# Patient Record
Sex: FEMALE | Race: WHITE | URBAN - NONMETROPOLITAN AREA
[De-identification: names, ages, dates, MRNs, and addresses within clinical notes are randomized per-mention and may not be internally consistent; named-entity substitution may affect disease eponyms.]

---

## 2017-11-07 ENCOUNTER — DOCTOR'S OFFICE (OUTPATIENT)
Dept: URBAN - NONMETROPOLITAN AREA CLINIC 1 | Facility: CLINIC | Age: 57
Setting detail: OPHTHALMOLOGY
End: 2017-11-07
Payer: COMMERCIAL

## 2017-11-07 DIAGNOSIS — H52.4: ICD-10-CM

## 2017-11-07 PROBLEM — H02.831 DERMATOCHALASIS: Status: ACTIVE | Noted: 2017-11-07

## 2017-11-07 PROBLEM — H04.123 DRY EYE; BOTH EYES: Status: ACTIVE | Noted: 2017-11-07

## 2017-11-07 PROBLEM — H27.8 PSEUDOPHAKIA OU: Status: ACTIVE | Noted: 2017-11-07

## 2017-11-07 PROBLEM — H01.001 BLEPHARITIS: Status: ACTIVE | Noted: 2017-11-07

## 2017-11-07 PROBLEM — H40.053: Status: ACTIVE | Noted: 2017-11-07

## 2017-11-07 PROBLEM — H43.813 POSTERIOR VITREOUS DETACHMENT: Status: ACTIVE | Noted: 2017-11-07

## 2017-11-07 PROCEDURE — 92015 DETERMINE REFRACTIVE STATE: CPT | Performed by: OPTOMETRIST

## 2017-11-07 ASSESSMENT — REFRACTION_CURRENTRX
OD_ADD: +2.75
OS_CYLINDER: -0.25
OS_OVR_VA: 20/
OD_VPRISM_DIRECTION: PROGS
OD_AXIS: 30
OD_AXIS: 010
OS_ADD: +2.75
OD_VPRISM_DIRECTION: PROGS
OS_AXIS: 23
OS_OVR_VA: 20/
OD_CYLINDER: -0.75
OD_OVR_VA: 20/
OS_CYLINDER: -1.25
OS_AXIS: 026
OS_SPHERE: +0.25
OD_SPHERE: PLANO
OS_VPRISM_DIRECTION: PROGS
OD_SPHERE: PLANO
OS_OVR_VA: 20/
OS_VPRISM_DIRECTION: PROGS
OS_ADD: +2.75
OD_CYLINDER: -0.50
OS_SPHERE: +0.50
OD_OVR_VA: 20/
OD_ADD: +2.75
OD_OVR_VA: 20/

## 2017-11-07 ASSESSMENT — VISUAL ACUITY
OD_BCVA: 20/20
OS_BCVA: 20/20

## 2017-11-07 ASSESSMENT — REFRACTION_MANIFEST
OS_VA2: 20/
OD_VA2: 20/
OS_VA1: 20/
OS_VA1: 20/
OU_VA: 20/
OD_VA3: 20/
OD_VA3: 20/
OD_VA1: 20/
OS_VA2: 20/
OS_VA3: 20/
OU_VA: 20/
OS_VA3: 20/
OD_VA1: 20/
OD_VA2: 20/

## 2017-11-07 ASSESSMENT — REFRACTION_OUTSIDERX
OD_SPHERE: PLANO
OD_VA3: 20/
OS_VA2: 20/20
OS_ADD: +2.75
OD_AXIS: 030
OS_AXIS: 025
OD_VA2: 20/20
OD_CYLINDER: -0.50
OS_SPHERE: +0.25
OS_VA3: 20/
OS_VA1: 20/20
OS_CYLINDER: -1.00
OD_ADD: +2.75
OU_VA: 20/20
OD_VA1: 20/20

## 2017-11-07 ASSESSMENT — REFRACTION_AUTOREFRACTION
OS_SPHERE: +0.75
OD_SPHERE: 0.00
OD_CYLINDER: -1.00
OS_CYLINDER: -1.75
OD_AXIS: 38
OS_AXIS: 35

## 2017-11-07 ASSESSMENT — SPHEQUIV_DERIVED
OS_SPHEQUIV: -0.125
OD_SPHEQUIV: -0.5

## 2022-07-06 ENCOUNTER — APPOINTMENT (EMERGENCY)
Dept: CT IMAGING | Facility: HOSPITAL | Age: 62
End: 2022-07-06
Payer: COMMERCIAL

## 2022-07-06 ENCOUNTER — HOSPITAL ENCOUNTER (EMERGENCY)
Facility: HOSPITAL | Age: 62
Discharge: HOME | End: 2022-07-06
Attending: EMERGENCY MEDICINE | Admitting: EMERGENCY MEDICINE
Payer: COMMERCIAL

## 2022-07-06 VITALS
OXYGEN SATURATION: 94 % | RESPIRATION RATE: 20 BRPM | BODY MASS INDEX: 41.4 KG/M2 | SYSTOLIC BLOOD PRESSURE: 116 MMHG | DIASTOLIC BLOOD PRESSURE: 73 MMHG | WEIGHT: 270 LBS | HEART RATE: 99 BPM | TEMPERATURE: 96.8 F

## 2022-07-06 DIAGNOSIS — J18.9 PNEUMONIA OF RIGHT LOWER LOBE DUE TO INFECTIOUS ORGANISM: Primary | ICD-10-CM

## 2022-07-06 LAB
ALBUMIN SERPL BCP-MCNC: 3.5 G/DL (ref 3.5–5)
ALP SERPL-CCNC: 96 U/L (ref 46–116)
ALT SERPL W P-5'-P-CCNC: 30 U/L (ref 12–78)
ANION GAP SERPL CALCULATED.3IONS-SCNC: 9 MMOL/L (ref 4–13)
AST SERPL W P-5'-P-CCNC: 15 U/L (ref 5–45)
BACTERIA UR QL AUTO: NORMAL /HPF
BASOPHILS # BLD AUTO: 0.04 THOUSANDS/ΜL (ref 0–0.1)
BASOPHILS NFR BLD AUTO: 0 % (ref 0–1)
BILIRUB SERPL-MCNC: 0.68 MG/DL (ref 0.2–1)
BILIRUB UR QL STRIP: NEGATIVE
BUN SERPL-MCNC: 9 MG/DL (ref 5–25)
CALCIUM SERPL-MCNC: 9.2 MG/DL (ref 8.3–10.1)
CHLORIDE SERPL-SCNC: 100 MMOL/L (ref 100–108)
CLARITY UR: CLEAR
CO2 SERPL-SCNC: 26 MMOL/L (ref 21–32)
COLOR UR: YELLOW
CREAT SERPL-MCNC: 0.92 MG/DL (ref 0.6–1.3)
EOSINOPHIL # BLD AUTO: 0.15 THOUSAND/ΜL (ref 0–0.61)
EOSINOPHIL NFR BLD AUTO: 1 % (ref 0–6)
ERYTHROCYTE [DISTWIDTH] IN BLOOD BY AUTOMATED COUNT: 13.5 % (ref 11.6–15.1)
GFR SERPL CREATININE-BSD FRML MDRD: 66 ML/MIN/1.73SQ M
GLUCOSE SERPL-MCNC: 136 MG/DL (ref 65–140)
GLUCOSE UR STRIP-MCNC: ABNORMAL MG/DL
HCT VFR BLD AUTO: 42.5 % (ref 34.8–46.1)
HGB BLD-MCNC: 13.9 G/DL (ref 11.5–15.4)
HGB UR QL STRIP.AUTO: ABNORMAL
IMM GRANULOCYTES # BLD AUTO: 0.05 THOUSAND/UL (ref 0–0.2)
IMM GRANULOCYTES NFR BLD AUTO: 0 % (ref 0–2)
KETONES UR STRIP-MCNC: NEGATIVE MG/DL
LEUKOCYTE ESTERASE UR QL STRIP: ABNORMAL
LYMPHOCYTES # BLD AUTO: 2.27 THOUSANDS/ΜL (ref 0.6–4.47)
LYMPHOCYTES NFR BLD AUTO: 19 % (ref 14–44)
MCH RBC QN AUTO: 28.7 PG (ref 26.8–34.3)
MCHC RBC AUTO-ENTMCNC: 32.7 G/DL (ref 31.4–37.4)
MCV RBC AUTO: 88 FL (ref 82–98)
MONOCYTES # BLD AUTO: 0.86 THOUSAND/ΜL (ref 0.17–1.22)
MONOCYTES NFR BLD AUTO: 7 % (ref 4–12)
NEUTROPHILS # BLD AUTO: 8.83 THOUSANDS/ΜL (ref 1.85–7.62)
NEUTS SEG NFR BLD AUTO: 73 % (ref 43–75)
NITRITE UR QL STRIP: NEGATIVE
NON-SQ EPI CELLS URNS QL MICRO: NORMAL /HPF
NRBC BLD AUTO-RTO: 0 /100 WBCS
PH UR STRIP.AUTO: 6 [PH]
PLATELET # BLD AUTO: 230 THOUSANDS/UL (ref 149–390)
PMV BLD AUTO: 8.6 FL (ref 8.9–12.7)
POTASSIUM SERPL-SCNC: 3.7 MMOL/L (ref 3.5–5.3)
PROT SERPL-MCNC: 8.2 G/DL (ref 6.4–8.2)
PROT UR STRIP-MCNC: NEGATIVE MG/DL
RBC # BLD AUTO: 4.84 MILLION/UL (ref 3.81–5.12)
RBC #/AREA URNS AUTO: NORMAL /HPF
SODIUM SERPL-SCNC: 135 MMOL/L (ref 136–145)
SP GR UR STRIP.AUTO: 1.01 (ref 1–1.03)
UROBILINOGEN UR QL STRIP.AUTO: 0.2 E.U./DL
WBC # BLD AUTO: 12.2 THOUSAND/UL (ref 4.31–10.16)
WBC #/AREA URNS AUTO: NORMAL /HPF

## 2022-07-06 PROCEDURE — 96375 TX/PRO/DX INJ NEW DRUG ADDON: CPT

## 2022-07-06 PROCEDURE — 99284 EMERGENCY DEPT VISIT MOD MDM: CPT

## 2022-07-06 PROCEDURE — 96374 THER/PROPH/DIAG INJ IV PUSH: CPT

## 2022-07-06 PROCEDURE — 80053 COMPREHEN METABOLIC PANEL: CPT | Performed by: EMERGENCY MEDICINE

## 2022-07-06 PROCEDURE — 99285 EMERGENCY DEPT VISIT HI MDM: CPT | Performed by: EMERGENCY MEDICINE

## 2022-07-06 PROCEDURE — 74176 CT ABD & PELVIS W/O CONTRAST: CPT

## 2022-07-06 PROCEDURE — 36415 COLL VENOUS BLD VENIPUNCTURE: CPT | Performed by: EMERGENCY MEDICINE

## 2022-07-06 PROCEDURE — 85025 COMPLETE CBC W/AUTO DIFF WBC: CPT | Performed by: EMERGENCY MEDICINE

## 2022-07-06 PROCEDURE — 81001 URINALYSIS AUTO W/SCOPE: CPT | Performed by: EMERGENCY MEDICINE

## 2022-07-06 PROCEDURE — G1004 CDSM NDSC: HCPCS

## 2022-07-06 RX ORDER — ONDANSETRON 2 MG/ML
4 INJECTION INTRAMUSCULAR; INTRAVENOUS ONCE
Status: COMPLETED | OUTPATIENT
Start: 2022-07-06 | End: 2022-07-06

## 2022-07-06 RX ORDER — CELECOXIB 200 MG/1
200 CAPSULE ORAL 2 TIMES DAILY
COMMUNITY
End: 2022-07-11

## 2022-07-06 RX ORDER — MORPHINE SULFATE 4 MG/ML
4 INJECTION, SOLUTION INTRAMUSCULAR; INTRAVENOUS ONCE
Status: COMPLETED | OUTPATIENT
Start: 2022-07-06 | End: 2022-07-06

## 2022-07-06 RX ORDER — LEVOTHYROXINE SODIUM 0.12 MG/1
125 TABLET ORAL DAILY
Status: ON HOLD | COMMUNITY
Start: 2022-01-20 | End: 2022-07-10 | Stop reason: CLARIF

## 2022-07-06 RX ORDER — AZITHROMYCIN 250 MG/1
TABLET, FILM COATED ORAL
Qty: 6 TABLET | Refills: 0 | Status: SHIPPED | OUTPATIENT
Start: 2022-07-06 | End: 2022-07-11

## 2022-07-06 RX ORDER — KETOROLAC TROMETHAMINE 30 MG/ML
30 INJECTION, SOLUTION INTRAMUSCULAR; INTRAVENOUS ONCE
Status: COMPLETED | OUTPATIENT
Start: 2022-07-06 | End: 2022-07-06

## 2022-07-06 RX ORDER — LISINOPRIL 10 MG/1
10 TABLET ORAL DAILY
COMMUNITY
Start: 2021-10-14 | End: 2022-10-09

## 2022-07-06 RX ADMIN — ONDANSETRON 4 MG: 2 INJECTION INTRAMUSCULAR; INTRAVENOUS at 19:48

## 2022-07-06 RX ADMIN — SODIUM CHLORIDE 1000 ML: 0.9 INJECTION, SOLUTION INTRAVENOUS at 19:48

## 2022-07-06 RX ADMIN — KETOROLAC TROMETHAMINE 30 MG: 30 INJECTION, SOLUTION INTRAMUSCULAR at 19:49

## 2022-07-06 RX ADMIN — MORPHINE SULFATE 4 MG: 4 INJECTION INTRAVENOUS at 19:49

## 2022-07-06 NOTE — ED PROVIDER NOTES
History  Chief Complaint   Patient presents with    Flank Pain     Presents due to R flank pain x2 days, denies N/V  Sent by [facility] due to blood in urine      Right flank pain since last night  No dysuria or hematuria  No nausea or vomiting  No abdominal pain  Patient does complain of dry cough and fatigue  No other complaints  History provided by:  Patient   used: No    Flank Pain  Pain location:  R flank  Pain quality: aching    Pain radiates to:  Does not radiate  Pain severity:  Moderate  Onset quality:  Gradual  Duration:  1 day  Timing:  Constant  Progression:  Unchanged  Chronicity:  New  Relieved by:  Nothing  Worsened by:  Nothing  Ineffective treatments:  None tried  Associated symptoms: cough and fatigue    Associated symptoms: no belching, no chest pain, no chills, no constipation, no diarrhea, no dysuria, no fever, no hematemesis, no hematochezia, no hematuria, no melena, no nausea, no shortness of breath, no sore throat and no vomiting        Prior to Admission Medications   Prescriptions Last Dose Informant Patient Reported? Taking?    Cholecalciferol 1 25 MG (72006 UT) capsule   Yes Yes   Sig: TAKE 1 CAPSULE BY MOUTH ONE TIME PER WEEK   Dapagliflozin Propanediol (Farxiga) 5 MG TABS   Yes Yes   Si mg   celecoxib (CeleBREX) 200 mg capsule   Yes Yes   Sig: Take 200 mg by mouth 2 (two) times a day   levothyroxine (Levoxyl) 125 mcg tablet   Yes Yes   Sig:   Start: 22 9:11:00 EST, 1 tab, PO, Daily, Disp# 90 tab, Refills: 3, Brand Medically Necessary, Pharmacy: [facility]/pharmacy #[id number]  lisinopril (ZESTRIL) 10 mg tablet   Yes Yes   Sig: 10 mg      Facility-Administered Medications: None       Past Medical History:   Diagnosis Date    Diabetes mellitus ([address] )     Disease of thyroid gland     GERD (gastroesophageal reflux disease)     Hyperlipidemia     Osteoarthritis     Spondylosis        Past Surgical History:   Procedure Laterality Date    APPENDECTOMY       SECTION      CHOLECYSTECTOMY      HERNIA REPAIR      TOE SURGERY      TONSILLECTOMY      TUBAL LIGATION         History reviewed  No pertinent family history  I have reviewed and agree with the history as documented  E-Cigarette/Vaping     E-Cigarette/Vaping Substances     Social History     Tobacco Use    Smoking status: Never Smoker    Smokeless tobacco: Never Used   Substance Use Topics    Alcohol use: Never    Drug use: Never       Review of Systems   Constitutional: Positive for fatigue  Negative for chills and fever  HENT: Negative for ear pain, hearing loss, sore throat, trouble swallowing and voice change  Eyes: Negative for pain and discharge  Respiratory: Positive for cough  Negative for shortness of breath and wheezing  Cardiovascular: Negative for chest pain and palpitations  Gastrointestinal: Negative for abdominal pain, blood in stool, constipation, diarrhea, hematemesis, hematochezia, melena, nausea and vomiting  Genitourinary: Positive for flank pain  Negative for dysuria, frequency and hematuria  Musculoskeletal: Negative for joint swelling, neck pain and neck stiffness  Skin: Negative for rash and wound  Neurological: Negative for dizziness, seizures, syncope, facial asymmetry and headaches  Psychiatric/Behavioral: Negative for hallucinations, self-injury and suicidal ideas  All other systems reviewed and are negative  Physical Exam  Physical Exam  Vitals and nursing note reviewed  Constitutional:       General: She is not in acute distress  Appearance: She is well-developed  She is obese  HENT:      Head: Normocephalic and atraumatic  Right Ear: External ear normal       Left Ear: External ear normal    Eyes:      General: No scleral icterus  Right eye: No discharge  Left eye: No discharge  Extraocular Movements: Extraocular movements intact        Conjunctiva/sclera: Conjunctivae normal    Cardiovascular:      Rate and Rhythm: Normal rate and regular rhythm  Heart sounds: Normal heart sounds  No murmur heard  Pulmonary:      Effort: Pulmonary effort is normal       Breath sounds: Normal breath sounds  No wheezing or rales  Abdominal:      General: Bowel sounds are normal  There is no distension  Palpations: Abdomen is soft  Tenderness: There is no abdominal tenderness  There is no guarding or rebound  Musculoskeletal:         General: No deformity  Normal range of motion  Cervical back: Normal range of motion and neck supple  Skin:     General: Skin is warm and dry  Findings: No rash  Neurological:      General: No focal deficit present  Mental Status: She is alert and oriented to person, place, and time  Cranial Nerves: No cranial nerve deficit  Psychiatric:         Mood and Affect: Mood normal          Behavior: Behavior normal          Thought Content:  Thought content normal          Judgment: Judgment normal          Vital Signs  ED Triage Vitals   Temperature Pulse Respirations Blood Pressure SpO2   07/06/22 1821 07/06/22 1821 07/06/22 1821 07/06/22 1821 07/06/22 1821   (!) 96 8 °F (36 °C) (!) 120 20 141/83 96 %      Temp Source Heart Rate Source Patient Position - Orthostatic VS BP Location FiO2 (%)   07/06/22 1821 07/06/22 1830 07/06/22 1821 07/06/22 1821 --   Temporal Monitor Lying Right arm       Pain Score       07/06/22 1821       7           Vitals:    07/06/22 1830 07/06/22 1845 07/06/22 1900 07/06/22 2015   BP: 122/74 125/64 127/64 116/73   Pulse: (!) 116 102 98 99   Patient Position - Orthostatic VS:             Visual Acuity      ED Medications  Medications   sodium chloride 0 9 % bolus 1,000 mL (0 mL Intravenous Stopped 7/6/22 2009)   ondansetron (ZOFRAN) injection 4 mg (4 mg Intravenous Given 7/6/22 1948)   ketorolac (TORADOL) injection 30 mg (30 mg Intravenous Given 7/6/22 1949)   morphine injection 4 mg (4 mg Intravenous Given 7/6/22 1949)       Diagnostic Studies  Results Reviewed     Procedure Component Value Units Date/Time    Urine Microscopic [[id number]]  (Normal) Collected: 07/06/22 1952    Lab Status: Final result Specimen: Urine, Clean Catch Updated: 07/06/22 2013     RBC, UA 2-4 /hpf      WBC, UA 0-1 /hpf      Epithelial Cells None Seen /hpf      Bacteria, UA Occasional /hpf     Comprehensive metabolic panel [[id number]]  (Abnormal) Collected: 07/06/22 1947    Lab Status: Final result Specimen: Blood from Arm, Right Updated: 07/06/22 2007     Sodium 135 mmol/L      Potassium 3 7 mmol/L      Chloride 100 mmol/L      CO2 26 mmol/L      ANION GAP 9 mmol/L      BUN 9 mg/dL      Creatinine 0 92 mg/dL      Glucose 136 mg/dL      Calcium 9 2 mg/dL      AST 15 U/L      ALT 30 U/L      Alkaline Phosphatase 96 U/L      Total Protein 8 2 g/dL      Albumin 3 5 g/dL      Total Bilirubin 0 68 mg/dL      eGFR 66 ml/min/1 73sq m     Narrative:      [place] guidelines for Chronic Kidney Disease (CKD):     Stage 1 with normal or high GFR (GFR > 90 mL/min/1 73 square meters)    Stage 2 Mild CKD (GFR = 60-89 mL/min/1 73 square meters)    Stage 3A Moderate CKD (GFR = 45-59 mL/min/1 73 square meters)    Stage 3B Moderate CKD (GFR = 30-44 mL/min/1 73 square meters)    Stage 4 Severe CKD (GFR = 15-29 mL/min/1 73 square meters)    Stage 5 End Stage CKD (GFR <15 mL/min/1 73 square meters)  Note: GFR calculation is accurate only with a steady state creatinine    UA w Reflex to Microscopic w Reflex to Culture [[id number]]  (Abnormal) Collected: 07/06/22 1952    Lab Status: Final result Specimen: Urine, Clean Catch Updated: 07/06/22 2000     Color, UA Yellow     Clarity, UA Clear     Specific Gravity, UA 1 010     pH, UA 6 0     Leukocytes, UA Elevated glucose may cause decreased leukocyte values   See urine microscopic for [facility] result/     Nitrite, UA Negative     Protein, UA Negative mg/dl      Glucose, UA >=1000 (1%) mg/dl      Ketones, UA Negative mg/dl Urobilinogen, UA 0 2 E U /dl      Bilirubin, UA Negative     Occult Blood, UA Small    CBC and differential [[id number]]  (Abnormal) Collected: 07/06/22 1947    Lab Status: Final result Specimen: Blood from Arm, Right Updated: 07/06/22 1951     WBC 12 20 Thousand/uL      RBC 4 84 Million/uL      Hemoglobin 13 9 g/dL      Hematocrit 42 5 %      MCV 88 fL      MCH 28 7 pg      MCHC 32 7 g/dL      RDW 13 5 %      MPV 8 6 fL      Platelets 546 Thousands/uL      nRBC 0 /100 WBCs      Neutrophils Relative 73 %      Immat GRANS % 0 %      Lymphocytes Relative 19 %      Monocytes Relative 7 %      Eosinophils Relative 1 %      Basophils Relative 0 %      Neutrophils Absolute 8 83 Thousands/µL      Immature Grans Absolute 0 05 Thousand/uL      Lymphocytes Absolute 2 27 Thousands/µL      Monocytes Absolute 0 86 Thousand/µL      Eosinophils Absolute 0 15 Thousand/µL      Basophils Absolute 0 04 Thousands/µL                  CT renal stone study abdomen pelvis wo contrast   Final Result by Rah De León MD (07/06 2051)      1  No renal system stone   2  Asymmetric dependent opacities in the right base, may represent pneumonia               Workstation performed: [id number]                    Procedures  Procedures         ED Course                               SBIRT 20yo+    Flowsheet Row Most Recent Value   SBIRT (25 yo +)    In order to provide better care to our patients, we are screening all of our patients for alcohol and drug use  Would it be okay to ask you these screening questions? Yes Filed at: 07/06/2022 1830   Initial Alcohol Screen: US AUDIT-C     1  How often do you have a drink containing alcohol? 0 Filed at: 07/06/2022 1830   2  How many drinks containing alcohol do you have on a typical day you are drinking? 0 Filed at: 07/06/2022 1830   3a  Male UNDER 65: How often do you have five or more drinks on one occasion? 0 Filed at: 07/06/2022 1830   3b  FEMALE Any Age, or MALE 65+:  How often do you have 4 or more drinks on one occassion? 0 Filed at: 07/06/2022 1830   Audit-C Score 0 Filed at: 07/06/2022 1830   JOY: How many times in the past year have you    Used an illegal drug or used a prescription medication for non-medical reasons? Never Filed at: 07/06/2022 1830                    [facility]  Number of Diagnoses or Management Options  Pneumonia of right lower lobe due to infectious organism  Diagnosis management comments: Lab work benign, urinalysis negative, CT negative for ureteral pathology but does represent right lower lobe pneumonia  This is consistent with patient's history of dry cough  Amount and/or Complexity of Data Reviewed  Clinical lab tests: ordered and reviewed  Tests in the radiology section of CPT®: ordered and reviewed  Decide to obtain previous medical records or to obtain history from someone other than the patient: yes  Review and summarize past medical records: yes  Independent visualization of images, tracings, or specimens: yes        Disposition  Final diagnoses:   Pneumonia of right lower lobe due to infectious organism     Time reflects when diagnosis was documented in both MDM as applicable and the Disposition within this note     Time User Action Codes Description Comment    7/6/2022  9:30 PM Ashtyn Araujo Add [J18 9] Pneumonia of right lower lobe due to infectious organism       ED Disposition     ED Disposition   Discharge    Condition   Stable    Date/Time   Wed Jul 6, 2022  9:29 PM    Comment   Tate Schroeder discharge to home/self care                 Follow-up Information     Follow up With Specialties Details Why Contact Info    Toro Murillo   As needed [address] [facility] [id number]            Discharge Medication List as of 7/6/2022  9:31 PM      START taking these medications    Details   azithromycin (ZITHROMAX) 250 mg tablet Take 2 tablets today then 1 tablet daily x 4 days, Normal         CONTINUE these medications which have NOT CHANGED    Details   celecoxib (CeleBREX) 200 mg capsule Take 200 mg by mouth 2 (two) times a day, Historical Med      Cholecalciferol 1 25 MG (41536 UT) capsule TAKE 1 CAPSULE BY MOUTH ONE TIME PER WEEK, Historical Med      Dapagliflozin Propanediol (Farxiga) 5 MG TABS 5 mg, Starting Mon 6/6/2022, Historical Med      levothyroxine (Levoxyl) 125 mcg tablet   Start: 01/20/22 9:11:00 EST, 1 tab, PO, Daily, Disp# 90 tab, Refills: 3, Brand Medically Necessary, Pharmacy: [facility]/pharmacy #[id number], Historical Med      lisinopril (ZESTRIL) 10 mg tablet 10 mg, Starting Thu 10/14/2021, Until Sun 10/9/2022 at 2359, Historical Med             No discharge procedures on file      PDMP Review     None          ED Provider  Electronically Signed by           Heavenly Castellanos MD  07/06/22 5306

## 2022-07-10 ENCOUNTER — APPOINTMENT (EMERGENCY)
Dept: CT IMAGING | Facility: HOSPITAL | Age: 62
DRG: 176 | End: 2022-07-10
Payer: COMMERCIAL

## 2022-07-10 ENCOUNTER — HOSPITAL ENCOUNTER (INPATIENT)
Facility: HOSPITAL | Age: 62
LOS: 1 days | Discharge: HOME | DRG: 176 | End: 2022-07-11
Attending: EMERGENCY MEDICINE | Admitting: INTERNAL MEDICINE
Payer: COMMERCIAL

## 2022-07-10 DIAGNOSIS — I26.09 ACUTE PULMONARY EMBOLISM WITH ACUTE COR PULMONALE, UNSPECIFIED PULMONARY EMBOLISM TYPE (HCC): Primary | ICD-10-CM

## 2022-07-10 DIAGNOSIS — R93.89 ABNORMAL CT SCAN: ICD-10-CM

## 2022-07-10 PROBLEM — E03.9 HYPOTHYROIDISM: Status: ACTIVE | Noted: 2022-07-10

## 2022-07-10 PROBLEM — E78.5 HYPERLIPIDEMIA: Status: ACTIVE | Noted: 2022-07-10

## 2022-07-10 PROBLEM — E11.9 DIABETES MELLITUS (HCC): Status: ACTIVE | Noted: 2022-07-10

## 2022-07-10 PROBLEM — I26.99 ACUTE PULMONARY EMBOLISM (HCC): Status: ACTIVE | Noted: 2022-07-10

## 2022-07-10 PROBLEM — I10 PRIMARY HYPERTENSION: Status: ACTIVE | Noted: 2022-07-10

## 2022-07-10 LAB
ALBUMIN SERPL BCP-MCNC: 2.7 G/DL (ref 3.5–5)
ALP SERPL-CCNC: 130 U/L (ref 46–116)
ALT SERPL W P-5'-P-CCNC: 39 U/L (ref 12–78)
ANION GAP SERPL CALCULATED.3IONS-SCNC: 7 MMOL/L (ref 4–13)
APTT PPP: 166 SECONDS (ref 23–37)
APTT PPP: 29 SECONDS (ref 23–37)
AST SERPL W P-5'-P-CCNC: 19 U/L (ref 5–45)
BACTERIA UR QL AUTO: NORMAL /HPF
BASOPHILS # BLD AUTO: 0.03 THOUSANDS/ΜL (ref 0–0.1)
BASOPHILS NFR BLD AUTO: 0 % (ref 0–1)
BILIRUB SERPL-MCNC: 0.35 MG/DL (ref 0.2–1)
BILIRUB UR QL STRIP: NEGATIVE
BUN SERPL-MCNC: 9 MG/DL (ref 5–25)
CALCIUM ALBUM COR SERPL-MCNC: 9.9 MG/DL (ref 8.3–10.1)
CALCIUM SERPL-MCNC: 8.9 MG/DL (ref 8.3–10.1)
CHLORIDE SERPL-SCNC: 102 MMOL/L (ref 100–108)
CLARITY UR: CLEAR
CO2 SERPL-SCNC: 27 MMOL/L (ref 21–32)
COLOR UR: YELLOW
CREAT SERPL-MCNC: 0.84 MG/DL (ref 0.6–1.3)
EOSINOPHIL # BLD AUTO: 0.25 THOUSAND/ΜL (ref 0–0.61)
EOSINOPHIL NFR BLD AUTO: 3 % (ref 0–6)
ERYTHROCYTE [DISTWIDTH] IN BLOOD BY AUTOMATED COUNT: 13.2 % (ref 11.6–15.1)
GFR SERPL CREATININE-BSD FRML MDRD: 73 ML/MIN/1.73SQ M
GLUCOSE SERPL-MCNC: 134 MG/DL (ref 65–140)
GLUCOSE SERPL-MCNC: 143 MG/DL (ref 65–140)
GLUCOSE SERPL-MCNC: 153 MG/DL (ref 65–140)
GLUCOSE UR STRIP-MCNC: NEGATIVE MG/DL
HCT VFR BLD AUTO: 37.5 % (ref 34.8–46.1)
HGB BLD-MCNC: 12 G/DL (ref 11.5–15.4)
HGB UR QL STRIP.AUTO: ABNORMAL
IMM GRANULOCYTES # BLD AUTO: 0.05 THOUSAND/UL (ref 0–0.2)
IMM GRANULOCYTES NFR BLD AUTO: 1 % (ref 0–2)
INR PPP: 0.98 (ref 0.84–1.19)
KETONES UR STRIP-MCNC: NEGATIVE MG/DL
LACTATE SERPL-SCNC: 1.1 MMOL/L (ref 0.5–2)
LEUKOCYTE ESTERASE UR QL STRIP: NEGATIVE
LIPASE SERPL-CCNC: 123 U/L (ref 73–393)
LYMPHOCYTES # BLD AUTO: 1.72 THOUSANDS/ΜL (ref 0.6–4.47)
LYMPHOCYTES NFR BLD AUTO: 21 % (ref 14–44)
MCH RBC QN AUTO: 28.2 PG (ref 26.8–34.3)
MCHC RBC AUTO-ENTMCNC: 32 G/DL (ref 31.4–37.4)
MCV RBC AUTO: 88 FL (ref 82–98)
MONOCYTES # BLD AUTO: 0.5 THOUSAND/ΜL (ref 0.17–1.22)
MONOCYTES NFR BLD AUTO: 6 % (ref 4–12)
NEUTROPHILS # BLD AUTO: 5.66 THOUSANDS/ΜL (ref 1.85–7.62)
NEUTS SEG NFR BLD AUTO: 69 % (ref 43–75)
NITRITE UR QL STRIP: NEGATIVE
NON-SQ EPI CELLS URNS QL MICRO: NORMAL /HPF
NRBC BLD AUTO-RTO: 0 /100 WBCS
PH UR STRIP.AUTO: 6.5 [PH]
PLATELET # BLD AUTO: 245 THOUSANDS/UL (ref 149–390)
PMV BLD AUTO: 8.2 FL (ref 8.9–12.7)
POTASSIUM SERPL-SCNC: 3.9 MMOL/L (ref 3.5–5.3)
PROT SERPL-MCNC: 7.8 G/DL (ref 6.4–8.2)
PROT UR STRIP-MCNC: NEGATIVE MG/DL
PROTHROMBIN TIME: 12.9 SECONDS (ref 11.6–14.5)
RBC # BLD AUTO: 4.25 MILLION/UL (ref 3.81–5.12)
RBC #/AREA URNS AUTO: NORMAL /HPF
SODIUM SERPL-SCNC: 136 MMOL/L (ref 136–145)
SP GR UR STRIP.AUTO: <=1.005 (ref 1–1.03)
UROBILINOGEN UR QL STRIP.AUTO: 0.2 E.U./DL
WBC # BLD AUTO: 8.21 THOUSAND/UL (ref 4.31–10.16)
WBC #/AREA URNS AUTO: NORMAL /HPF

## 2022-07-10 PROCEDURE — 85610 PROTHROMBIN TIME: CPT | Performed by: EMERGENCY MEDICINE

## 2022-07-10 PROCEDURE — 83605 ASSAY OF LACTIC ACID: CPT | Performed by: EMERGENCY MEDICINE

## 2022-07-10 PROCEDURE — 99223 1ST HOSP IP/OBS HIGH 75: CPT | Performed by: INTERNAL MEDICINE

## 2022-07-10 PROCEDURE — 99285 EMERGENCY DEPT VISIT HI MDM: CPT

## 2022-07-10 PROCEDURE — 96375 TX/PRO/DX INJ NEW DRUG ADDON: CPT

## 2022-07-10 PROCEDURE — 83690 ASSAY OF LIPASE: CPT | Performed by: EMERGENCY MEDICINE

## 2022-07-10 PROCEDURE — 85025 COMPLETE CBC W/AUTO DIFF WBC: CPT | Performed by: EMERGENCY MEDICINE

## 2022-07-10 PROCEDURE — 85730 THROMBOPLASTIN TIME PARTIAL: CPT | Performed by: EMERGENCY MEDICINE

## 2022-07-10 PROCEDURE — G1004 CDSM NDSC: HCPCS

## 2022-07-10 PROCEDURE — 71275 CT ANGIOGRAPHY CHEST: CPT

## 2022-07-10 PROCEDURE — NC001 PR NO CHARGE: Performed by: NURSE PRACTITIONER

## 2022-07-10 PROCEDURE — 82948 REAGENT STRIP/BLOOD GLUCOSE: CPT

## 2022-07-10 PROCEDURE — 80053 COMPREHEN METABOLIC PANEL: CPT | Performed by: EMERGENCY MEDICINE

## 2022-07-10 PROCEDURE — 81001 URINALYSIS AUTO W/SCOPE: CPT | Performed by: EMERGENCY MEDICINE

## 2022-07-10 PROCEDURE — 36415 COLL VENOUS BLD VENIPUNCTURE: CPT | Performed by: EMERGENCY MEDICINE

## 2022-07-10 PROCEDURE — 99285 EMERGENCY DEPT VISIT HI MDM: CPT | Performed by: EMERGENCY MEDICINE

## 2022-07-10 PROCEDURE — 74177 CT ABD & PELVIS W/CONTRAST: CPT

## 2022-07-10 PROCEDURE — 96361 HYDRATE IV INFUSION ADD-ON: CPT

## 2022-07-10 PROCEDURE — 96365 THER/PROPH/DIAG IV INF INIT: CPT

## 2022-07-10 RX ORDER — CHOLESTYRAMINE LIGHT 4 G/5.7G
4 POWDER, FOR SUSPENSION ORAL 2 TIMES DAILY
Status: DISCONTINUED | OUTPATIENT
Start: 2022-07-10 | End: 2022-07-11 | Stop reason: HOSPADM

## 2022-07-10 RX ORDER — LEVOTHYROXINE SODIUM 0.12 MG/1
125 TABLET ORAL
Status: DISCONTINUED | OUTPATIENT
Start: 2022-07-11 | End: 2022-07-10

## 2022-07-10 RX ORDER — HEPARIN SODIUM 10000 [USP'U]/100ML
3-30 INJECTION, SOLUTION INTRAVENOUS
Status: DISCONTINUED | OUTPATIENT
Start: 2022-07-10 | End: 2022-07-11

## 2022-07-10 RX ORDER — CHOLESTYRAMINE LIGHT 4 G/5.7G
4 POWDER, FOR SUSPENSION ORAL DAILY
COMMUNITY

## 2022-07-10 RX ORDER — KETOROLAC TROMETHAMINE 30 MG/ML
15 INJECTION, SOLUTION INTRAMUSCULAR; INTRAVENOUS ONCE
Status: COMPLETED | OUTPATIENT
Start: 2022-07-10 | End: 2022-07-10

## 2022-07-10 RX ORDER — OXYCODONE HYDROCHLORIDE 5 MG/1
5 TABLET ORAL EVERY 4 HOURS PRN
Status: DISCONTINUED | OUTPATIENT
Start: 2022-07-10 | End: 2022-07-11 | Stop reason: HOSPADM

## 2022-07-10 RX ORDER — LISINOPRIL 10 MG/1
10 TABLET ORAL DAILY
Status: DISCONTINUED | OUTPATIENT
Start: 2022-07-11 | End: 2022-07-11 | Stop reason: HOSPADM

## 2022-07-10 RX ORDER — LEVOTHYROXINE SODIUM 0.12 MG/1
125 TABLET ORAL
Status: DISCONTINUED | OUTPATIENT
Start: 2022-07-11 | End: 2022-07-11 | Stop reason: HOSPADM

## 2022-07-10 RX ORDER — ONDANSETRON 2 MG/ML
4 INJECTION INTRAMUSCULAR; INTRAVENOUS EVERY 4 HOURS PRN
Status: DISCONTINUED | OUTPATIENT
Start: 2022-07-10 | End: 2022-07-11 | Stop reason: HOSPADM

## 2022-07-10 RX ORDER — LEVOTHYROXINE SODIUM 0.12 MG/1
125 TABLET ORAL DAILY
COMMUNITY

## 2022-07-10 RX ORDER — HEPARIN SODIUM 1000 [USP'U]/ML
4800 INJECTION, SOLUTION INTRAVENOUS; SUBCUTANEOUS
Status: DISCONTINUED | OUTPATIENT
Start: 2022-07-10 | End: 2022-07-11

## 2022-07-10 RX ORDER — AZITHROMYCIN 250 MG/1
250 TABLET, FILM COATED ORAL DAILY
Status: COMPLETED | OUTPATIENT
Start: 2022-07-11 | End: 2022-07-11

## 2022-07-10 RX ORDER — HEPARIN SODIUM 1000 [USP'U]/ML
9600 INJECTION, SOLUTION INTRAVENOUS; SUBCUTANEOUS ONCE
Status: COMPLETED | OUTPATIENT
Start: 2022-07-10 | End: 2022-07-10

## 2022-07-10 RX ORDER — INSULIN LISPRO 100 [IU]/ML
1-6 INJECTION, SOLUTION INTRAVENOUS; SUBCUTANEOUS
Status: DISCONTINUED | OUTPATIENT
Start: 2022-07-10 | End: 2022-07-11 | Stop reason: HOSPADM

## 2022-07-10 RX ORDER — ACETAMINOPHEN 325 MG/1
650 TABLET ORAL EVERY 6 HOURS PRN
Status: DISCONTINUED | OUTPATIENT
Start: 2022-07-10 | End: 2022-07-11 | Stop reason: HOSPADM

## 2022-07-10 RX ORDER — HEPARIN SODIUM 1000 [USP'U]/ML
9600 INJECTION, SOLUTION INTRAVENOUS; SUBCUTANEOUS
Status: DISCONTINUED | OUTPATIENT
Start: 2022-07-10 | End: 2022-07-11

## 2022-07-10 RX ADMIN — HEPARIN SODIUM 9600 UNITS: 1000 INJECTION INTRAVENOUS; SUBCUTANEOUS at 12:38

## 2022-07-10 RX ADMIN — CHOLESTYRAMINE 4 G: 4 POWDER, FOR SUSPENSION ORAL at 16:51

## 2022-07-10 RX ADMIN — SODIUM CHLORIDE 1000 ML: 0.9 INJECTION, SOLUTION INTRAVENOUS at 10:26

## 2022-07-10 RX ADMIN — HEPARIN SODIUM 18 UNITS/KG/HR: 10000 INJECTION, SOLUTION INTRAVENOUS at 12:38

## 2022-07-10 RX ADMIN — ACETAMINOPHEN 650 MG: 325 TABLET ORAL at 22:02

## 2022-07-10 RX ADMIN — KETOROLAC TROMETHAMINE 15 MG: 30 INJECTION, SOLUTION INTRAMUSCULAR at 10:24

## 2022-07-10 RX ADMIN — IOHEXOL 60 ML: 350 INJECTION, SOLUTION INTRAVENOUS at 10:55

## 2022-07-10 RX ADMIN — INSULIN LISPRO 1 UNITS: 100 INJECTION, SOLUTION INTRAVENOUS; SUBCUTANEOUS at 16:51

## 2022-07-10 NOTE — ED PROVIDER NOTES
History  Chief Complaint   Patient presents with    Flank Pain     Was seen at [facility] for flank pain, told to come to the ED, had a CT done on Wednesday and told no stone, had some blood in her urine  No urination problems  Patient was seen here 4 days ago for right flank pain  Stone study was unremarkable except for possible right lower lobe pneumonia  Placed on Z-Elw  States the pain has not gotten better  Worse with certain movements  No dysuria frequency  No trauma  No rash  No lesions  Ibuprofen without any relief  No change with eating  Has had her gallbladder removed  History provided by:  Patient   used: No    Flank Pain  Pain location:  R flank  Pain quality: aching    Pain radiates to:  Does not radiate  Pain severity:  Mild  Onset quality:  Gradual  Duration:  1 week  Timing:  Constant  Progression:  Unchanged  Chronicity:  New  Context: not diet changes, not recent sexual activity and not suspicious food intake    Relieved by:  Nothing  Worsened by: Movement and deep breathing  Ineffective treatments:  None tried  Associated symptoms: anorexia and hematuria    Associated symptoms: no chest pain, no chills, no constipation, no cough, no diarrhea, no dysuria, no fever, no nausea, no shortness of breath, no sore throat and no vomiting        Prior to Admission Medications   Prescriptions Last Dose Informant Patient Reported? Taking?    Cholecalciferol 1 25 MG (89775 UT) capsule   Yes No   Sig: TAKE 1 CAPSULE BY MOUTH ONE TIME PER WEEK   Dapagliflozin Propanediol (Farxiga) 5 MG TABS   Yes No   Si mg   azithromycin (ZITHROMAX) 250 mg tablet   No No   Sig: Take 2 tablets today then 1 tablet daily x 4 days   celecoxib (CeleBREX) 200 mg capsule   Yes No   Sig: Take 200 mg by mouth 2 (two) times a day   levothyroxine (Levoxyl) 125 mcg tablet   Yes No   Sig:   Start: 22 9:11:00 EST, 1 tab, PO, Daily, Disp# 90 tab, Refills: 3, Brand Medically Necessary, Pharmacy: [id number]  lisinopril (ZESTRIL) 10 mg tablet   Yes No   Sig: 10 mg      Facility-Administered Medications: None       Past Medical History:   Diagnosis Date    Diabetes mellitus ([address] )     Disease of thyroid gland     GERD (gastroesophageal reflux disease)     Hyperlipidemia     Osteoarthritis     Spondylosis        Past Surgical History:   Procedure Laterality Date    APPENDECTOMY       SECTION      CHOLECYSTECTOMY      HERNIA REPAIR      TOE SURGERY      TONSILLECTOMY      TUBAL LIGATION         History reviewed  No pertinent family history  I have reviewed and agree with the history as documented  E-Cigarette/Vaping    E-Cigarette Use Never User      E-Cigarette/Vaping Substances     Social History     Tobacco Use    Smoking status: Never Smoker    Smokeless tobacco: Never Used   Vaping Use    Vaping Use: Never used   Substance Use Topics    Alcohol use: Never    Drug use: Never       Review of Systems   Constitutional: Negative for chills and fever  HENT: Negative for ear pain, hearing loss, sore throat, trouble swallowing and voice change  Eyes: Negative for pain and discharge  Respiratory: Negative for cough, shortness of breath and wheezing  Cardiovascular: Negative for chest pain and palpitations  Gastrointestinal: Positive for anorexia  Negative for abdominal pain, blood in stool, constipation, diarrhea, nausea and vomiting  Genitourinary: Positive for flank pain and hematuria  Negative for dysuria and frequency  Musculoskeletal: Negative for joint swelling, neck pain and neck stiffness  Skin: Negative for rash and wound  Neurological: Negative for dizziness, seizures, syncope, facial asymmetry and headaches  Psychiatric/Behavioral: Negative for hallucinations, self-injury and suicidal ideas  All other systems reviewed and are negative  Physical Exam  Physical Exam  Vitals and nursing note reviewed     Constitutional: General: She is not in acute distress  Appearance: She is well-developed  HENT:      Head: Normocephalic and atraumatic  Right Ear: External ear normal       Left Ear: External ear normal    Eyes:      General: No scleral icterus  Right eye: No discharge  Left eye: No discharge  Extraocular Movements: Extraocular movements intact  Conjunctiva/sclera: Conjunctivae normal    Cardiovascular:      Rate and Rhythm: Normal rate and regular rhythm  Heart sounds: Normal heart sounds  No murmur heard  Pulmonary:      Effort: Pulmonary effort is normal       Breath sounds: Normal breath sounds  No wheezing or rales  Abdominal:      General: Bowel sounds are normal  There is no distension  Palpations: Abdomen is soft  Tenderness: There is no abdominal tenderness  There is no guarding or rebound  Musculoskeletal:         General: No deformity  Normal range of motion  Cervical back: Normal range of motion and neck supple  Comments: Pain with certain movements  Tender along the right mid paralumbar region  Skin:     General: Skin is warm and dry  Findings: No rash  Neurological:      General: No focal deficit present  Mental Status: She is alert and oriented to person, place, and time  Cranial Nerves: No cranial nerve deficit  Psychiatric:         Mood and Affect: Mood normal          Behavior: Behavior normal          Thought Content:  Thought content normal          Judgment: Judgment normal          Vital Signs  ED Triage Vitals [07/10/22 0938]   Temperature Pulse Respirations Blood Pressure SpO2   97 9 °F (36 6 °C) 85 18 161/89 98 %      Temp Source Heart Rate Source Patient Position - Orthostatic VS BP Location FiO2 (%)   Temporal Monitor Sitting Right arm --      Pain Score       7           Vitals:    07/10/22 0938 07/10/22 1215 07/10/22 1230 07/10/22 1245   BP: 161/89 129/65  (!) 177/86   Pulse: 85 72 90 85   Patient Position - Orthostatic VS: Sitting            Visual Acuity      ED Medications  Medications   heparin (porcine) 25,000 units in 0 45% NaCl 250 mL infusion (premix) (18 Units/kg/hr × 120 kg (Order-Specific) Intravenous New Bag 7/10/22 1238)   heparin (porcine) injection 9,600 Units (has no administration in time range)   heparin (porcine) injection 4,800 Units (has no administration in time range)   sodium chloride 0 9 % bolus 1,000 mL (0 mL Intravenous Stopped 7/10/22 1126)   ketorolac (TORADOL) injection 15 mg (15 mg Intravenous Given 7/10/22 1024)   iohexol (OMNIPAQUE) 350 MG/ML injection (MULTI-DOSE) 60 mL (60 mL Intravenous Given 7/10/22 1055)   heparin (porcine) injection 9,600 Units (9,600 Units Intravenous Given 7/10/22 1238)       Diagnostic Studies  Results Reviewed     Procedure Component Value Units Date/Time    Urine Microscopic [[id number]]  (Normal) Collected: 07/10/22 0957    Lab Status: Final result Specimen: Urine, Clean Catch Updated: 07/10/22 1034     RBC, UA 1-2 /hpf      WBC, UA 1-2 /hpf      Epithelial Cells Occasional /hpf      Bacteria, UA Occasional /hpf     Lactic acid [[id number]]  (Normal) Collected: 07/10/22 1003    Lab Status: Final result Specimen: Blood from Arm, Right Updated: 07/10/22 1033     LACTIC ACID 1 1 mmol/L     Narrative:      Result may be elevated if tourniquet was used during collection      Comprehensive metabolic panel [[id number]]  (Abnormal) Collected: 07/10/22 1003    Lab Status: Final result Specimen: Blood from Arm, Right Updated: 07/10/22 1030     Sodium 136 mmol/L      Potassium 3 9 mmol/L      Chloride 102 mmol/L      CO2 27 mmol/L      ANION GAP 7 mmol/L      BUN 9 mg/dL      Creatinine 0 84 mg/dL      Glucose 143 mg/dL      Calcium 8 9 mg/dL      Corrected Calcium 9 9 mg/dL      AST 19 U/L      ALT 39 U/L      Alkaline Phosphatase 130 U/L      Total Protein 7 8 g/dL      Albumin 2 7 g/dL      Total Bilirubin 0 35 mg/dL      eGFR 73 ml/min/1 73sq m     Narrative: National Kidney Disease Foundation guidelines for Chronic Kidney Disease (CKD):     Stage 1 with normal or high GFR (GFR > 90 mL/min/1 73 square meters)    Stage 2 Mild CKD (GFR = 60-89 mL/min/1 73 square meters)    Stage 3A Moderate CKD (GFR = 45-59 mL/min/1 73 square meters)    Stage 3B Moderate CKD (GFR = 30-44 mL/min/1 73 square meters)    Stage 4 Severe CKD (GFR = 15-29 mL/min/1 73 square meters)    Stage 5 End Stage CKD (GFR <15 mL/min/1 73 square meters)  Note: GFR calculation is accurate only with a steady state creatinine    Lipase [[id number]]  (Normal) Collected: 07/10/22 1003    Lab Status: Final result Specimen: Blood from Arm, Right Updated: 07/10/22 1030     Lipase 123 u/L     Protime-INR [[id number]]  (Normal) Collected: 07/10/22 1003    Lab Status: Final result Specimen: Blood from Arm, Right Updated: 07/10/22 1025     Protime 12 9 seconds      INR 0 98    APTT [[id number]]  (Normal) Collected: 07/10/22 1003    Lab Status: Final result Specimen: Blood from Arm, Right Updated: 07/10/22 1025     PTT 29 seconds     UA w Reflex to Microscopic w Reflex to Culture [[id number]]  (Abnormal) Collected: 07/10/22 0957    Lab Status: Final result Specimen: Urine, Clean Catch Updated: 07/10/22 1018     Color, UA Yellow     Clarity, UA Clear     Specific Gravity, UA <=1 005     pH, UA 6 5     Leukocytes, UA Negative     Nitrite, UA Negative     Protein, UA Negative mg/dl      Glucose, UA Negative mg/dl      Ketones, UA Negative mg/dl      Urobilinogen, UA 0 2 E U /dl      Bilirubin, UA Negative     Occult Blood, UA Small    CBC and differential [[id number]]  (Abnormal) Collected: 07/10/22 1003    Lab Status: Final result Specimen: Blood from Arm, Right Updated: 07/10/22 1011     WBC 8 21 Thousand/uL      RBC 4 25 Million/uL      Hemoglobin 12 0 g/dL      Hematocrit 37 5 %      MCV 88 fL      MCH 28 2 pg      MCHC 32 0 g/dL      RDW 13 2 %      MPV 8 2 fL      Platelets 661 Thousands/uL      nRBC 0 /100 WBCs Neutrophils Relative 69 %      Immat GRANS % 1 %      Lymphocytes Relative 21 %      Monocytes Relative 6 %      Eosinophils Relative 3 %      Basophils Relative 0 %      Neutrophils Absolute 5 66 Thousands/µL      Immature Grans Absolute 0 05 Thousand/uL      Lymphocytes Absolute 1 72 Thousands/µL      Monocytes Absolute 0 50 Thousand/µL      Eosinophils Absolute 0 25 Thousand/µL      Basophils Absolute 0 03 Thousands/µL                  PE Study with CT Abdomen and Pelvis with contrast   Final Result by Candido Brown MD (07/10 1227)      CTA chest:      Right lower lobe lobar, segmental and subsegmental pulmonary emboli  No central pulmonary embolus  The calculated ratio of right ventricular to left ventricular diameter (RV/LV ratio) is 1 04  This is greater than 0 9, which is abnormal and indicates right heart strain  An abnormal RV/LV ratio has been shown to be associated with an increased risk of    30 day mortality in the setting of acute pulmonary embolism  Urgent consultation with the medical critical care team is recommended  Moderate-sized evolving right lower lobe pulmonary infarct and small right pleural effusion  Few punctate pulmonary nodules, 3 mm and smaller with unknown long-term stability  Based on current Fleischner Society 2017 Guidelines on incidental pulmonary nodule, no routine follow-up is needed if the patient is low risk  If the patient is high    risk, optional follow-up chest CT at 12 months can be considered  Minimally enlarged subcarinal lymph node  This could be followed up with chest CT in 3-6 months  CT abdomen and pelvis:      No evidence of acute abdominopelvic process  Colonic diverticulosis  Pertinent findings on this study conveyed by myself to [address] [place] [address] on 7/10/2022 at 12:21 via [email or web address] with prompt response         Workstation performed: [id number]                    Procedures  Procedures         ED Course SBIRT 20yo+    Flowsheet Row Most Recent Value   SBIRT (23 yo +)    In order to provide better care to our patients, we are screening all of our patients for alcohol and drug use  Would it be okay to ask you these screening questions? Yes Filed at: 07/10/2022 0678   Initial Alcohol Screen: US AUDIT-C     1  How often do you have a drink containing alcohol? 0 Filed at: 07/10/2022 0949   2  How many drinks containing alcohol do you have on a typical day you are drinking? 0 Filed at: 07/10/2022 0949   3b  FEMALE Any Age, or MALE 65+: How often do you have 4 or more drinks on one occassion? 0 Filed at: 07/10/2022 0949   Audit-C Score 0 Filed at: 07/10/2022 0183   JOY: How many times in the past year have you    Used an illegal drug or used a prescription medication for non-medical reasons? Never Filed at: 07/10/2022 5243                    MDM  Number of Diagnoses or Management Options     Amount and/or Complexity of Data Reviewed  Clinical lab tests: reviewed  Review and summarize past medical records: yes        Disposition  Final diagnoses:   Acute pulmonary embolism with acute cor pulmonale, unspecified pulmonary embolism type ([facility] [address] )     Time reflects when diagnosis was documented in both MDM as applicable and the Disposition within this note     Time User Action Codes Description Comment    7/10/2022 12:25 PM Daniela Francisco Add [I26 09] Acute pulmonary embolism with acute cor pulmonale, unspecified pulmonary embolism type [facility])       ED Disposition     ED Disposition   Admit    Condition   Stable    Date/Time   Sun Jul 10, 2022  1:01 PM    Comment   Admit to Dr Mamta Landaverde    None         Patient's Medications   Discharge Prescriptions    No medications on file       No discharge procedures on file      PDMP Review     None          ED Provider  Electronically Signed by           Brionna Esparza MD  07/10/22 Nhan Tello MD  07/10/22 [address] [place] [address]

## 2022-07-10 NOTE — ASSESSMENT & PLAN NOTE
Lab Results   Component Value Date    HGBA1C 7 1 (H) 08/17/2020       No results for input(s): POCGLU in the last 72 hours      · Intolerant to metformin due to GI side effects  · Continue farxiga and add sliding scale

## 2022-07-10 NOTE — H&P
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  · Started on heparin infusion  · Will send prescription for eliquis to [facility] to price out  · Check echocardiogram   Currently remains stable on room air    Abnormal CT scan  Assessment & Plan  · Few punctate pulmonary nodules, 3 mm and smaller with unknown long-term stability  Based on current Fleischner Society 2017 Guidelines on incidental pulmonary nodule, no routine follow-up is needed if the patient is low risk  If the patient is high risk, optional follow-up chest CT at 12 months can be considered  · Minimally enlarged subcarinal lymph node  This could be followed up with chest CT in 3-6 months  Primary hypertension  Assessment & Plan  · Continue lisinopril 10 mg daily    Diabetes mellitus ([facility] [address] )  Assessment & Plan  Lab Results   Component Value Date    HGBA1C 7 1 (H) 08/17/2020       No results for input(s): POCGLU in the last 72 hours  · Intolerant to metformin due to GI side effects  · Continue farxiga and add sliding scale    Hyperlipidemia  Assessment & Plan  · Intolerant to statins  Continue cholestyramine    Hypothyroidism  Assessment & Plan  · Continue levothyroxine  Patient uses brand name    Will try to bring in her own supply      VTE Prophylaxis: Heparin Drip  Code Status: Level 1 - Full Code  Anticipated Length of Stay:  Patient will be admitted on an Inpatient basis with an anticipated length of stay of  greater than 2 midnights  Justification for Hospital Stay: Acute pulmonary embolism ([facility] [address] )  Total Time for Visit, including Counseling / Coordination of Care: xx mins  Greater than 50% of this total time spent on direct patient counseling and coordination of care  Chief Complaint:     Flank Pain (Was seen at [address] [place] [address] urgent care for flank pain, told to come to the ED, had a CT done on Wednesday and told no stone, had some blood in her urine  No urination problems  )    History of Present Illness:    Raquel Blanco is a 57 y o  female with a past medical history of diabetes obesity hypertension hyperlipidemia who presents with worsening right flank pains  The patient initially presented to urgent care and subsequently to ED here on 7/6/2022 for right flank pain  She thought she had a kidney stone  Imaging did not show this but there was possible right lower lobe pneumonia and she was discharged with azithromycin  Friday the patient felt pretty well  Unfortunately starting yesterday she had worsening right flank pain again  No nausea vomiting diarrhea fevers chills chest pain or shortness of breath  She came back to the ED where she was found have right-sided PE and pulmonary infarct prompting admission  She denies any personal or family history of hypercoagulability  There is no smoking or hormonal use  She has been more sedentary as she is a  and has been sitting for hours folding tax statements to send out    Review of Systems:  Review of Systems   Constitutional: Negative for chills, diaphoresis and fever  HENT: Negative for dental problem and facial swelling  Eyes: Negative for photophobia, pain and visual disturbance  Respiratory: Negative for shortness of breath  Cardiovascular: Negative for chest pain  Gastrointestinal: Positive for abdominal pain  Negative for abdominal distention, diarrhea, nausea and vomiting     Genitourinary: Positive for flank pain  Negative for dysuria, hematuria and urgency  Musculoskeletal: Negative for back pain and myalgias  Skin: Negative for rash  Neurological: Negative for dizziness, numbness and headaches  Psychiatric/Behavioral: Negative for agitation  All other systems reviewed and are negative  Past Medical and Surgical History:   Past Medical History:   Diagnosis Date    Diabetes mellitus ([facility] [address] )     GERD (gastroesophageal reflux disease)     Hyperlipidemia     Hypothyroidism     Osteoarthritis     Primary hypertension     Spondylosis      Past Surgical History:   Procedure Laterality Date    APPENDECTOMY       SECTION      CHOLECYSTECTOMY      HERNIA REPAIR      TOE SURGERY      TONSILLECTOMY      TUBAL LIGATION       Meds/Allergies: Allergies: Allergies   Allergen Reactions    Codeine GI Intolerance    Liraglutide Diarrhea     MADE MY HEAD "FUZZY"  COULD NOT FOCUS    Pollen Extract Allergic Rhinitis    Statins Myalgia     Prior to Admission Medications   Prescriptions Last Dose Informant Patient Reported? Taking?    Cholecalciferol 1 25 MG (33224 UT) capsule   Yes No   Sig: TAKE 1 CAPSULE BY MOUTH ONE TIME PER WEEK   Dapagliflozin Propanediol 5 MG TABS   Yes No   Sig: Take 5 mg by mouth daily   azithromycin (ZITHROMAX) 250 mg tablet   No No   Sig: Take 2 tablets today then 1 tablet daily x 4 days   celecoxib (CeleBREX) 200 mg capsule   Yes No   Sig: Take 200 mg by mouth 2 (two) times a day   cholestyramine sugar free (QUESTRAN LIGHT) 4 g packet   Yes Yes   Sig: Take 4 g by mouth 2 (two) times a day   levothyroxine 125 mcg tablet   Yes No   Sig: Take 125 mcg by mouth daily   lisinopril (ZESTRIL) 10 mg tablet   Yes No   Sig: Take 10 mg by mouth daily      Facility-Administered Medications: None     Social History:     Social History     Socioeconomic History    Marital status: /Civil Union     Spouse name: Not on file    Number of children: Not on file   Willie Russell Years of education: Not on file    Highest education level: Not on file   Occupational History    Not on file   Tobacco Use    Smoking status: Never Smoker    Smokeless tobacco: Never Used   Vaping Use    Vaping Use: Never used   Substance and Sexual Activity    Alcohol use: Never    Drug use: Never    Sexual activity: Not on file   Other Topics Concern    Not on file   Social History Narrative    Not on file     Social Determinants of Health     Financial Resource Strain: Not on file   Food Insecurity: Not on file   Transportation Needs: Not on file   Physical Activity: Not on file   Stress: Not on file   Social Connections: Not on file   Intimate Partner Violence: Not on file   Housing Stability: Not on file     Patient Pre-hospital Living Situation:   Patient Pre-hospital Level of Mobility:   Patient Pre-hospital Diet Restrictions:     Family History:  History reviewed  No pertinent family history  Physical Exam:   Vitals:   Blood Pressure: 138/81 (07/10/22 1541)  Pulse: 87 (07/10/22 1541)  Temperature: 99 °F (37 2 °C) (07/10/22 1541)  Temp Source: Temporal (07/10/22 6025)  Respirations: 18 (07/10/22 1541)  Height: 5' 5" (165 1 cm) (07/10/22 4699)  Weight - Scale: 123 kg (271 lb 3 2 oz) (07/10/22 0938)  SpO2: 94 % (07/10/22 1541)    Physical Exam  Vitals reviewed  Constitutional:       General: She is not in acute distress  Appearance: Normal appearance  She is obese  HENT:      Head: Atraumatic  Mouth/Throat:      Mouth: Mucous membranes are moist    Eyes:      General: No scleral icterus  Extraocular Movements: Extraocular movements intact  Cardiovascular:      Rate and Rhythm: Normal rate and regular rhythm  Heart sounds: Normal heart sounds  Pulmonary:      Breath sounds: Decreased breath sounds and rhonchi (Right base) present  No wheezing  Abdominal:      General: Bowel sounds are normal       Palpations: Abdomen is soft  Tenderness:  There is no guarding or rebound  Musculoskeletal:         General: Tenderness (Focal area of tenderness right shin but no other calf tenderness) present  No swelling  Cervical back: Normal range of motion  Skin:     General: Skin is warm  Neurological:      General: No focal deficit present  Mental Status: She is alert and oriented to person, place, and time  Psychiatric:         Mood and Affect: Mood normal        Lab Results: I have personally reviewed pertinent reports      Results from last 7 days   Lab Units 07/10/22  1003   WBC Thousand/uL 8 21   HEMOGLOBIN g/dL 12 0   HEMATOCRIT % 37 5   PLATELETS Thousands/uL 245   NEUTROS PCT % 69   LYMPHS PCT % 21   MONOS PCT % 6   EOS PCT % 3     Results from last 7 days   Lab Units 07/10/22  1003 07/06/22  1947   SODIUM mmol/L 136 135*   POTASSIUM mmol/L 3 9 3 7   CHLORIDE mmol/L 102 100   CO2 mmol/L 27 26   ANION GAP mmol/L 7 9   BUN mg/dL 9 9   CREATININE mg/dL 0 84 0 92   CALCIUM mg/dL 8 9 9 2   ALBUMIN g/dL 2 7* 3 5   TOTAL BILIRUBIN mg/dL 0 35 0 68   ALK PHOS U/L 130* 96   ALT U/L 39 30   AST U/L 19 15   EGFR ml/min/1 73sq m 73 66   GLUCOSE RANDOM mg/dL 143* 136     Results from last 7 days   Lab Units 07/10/22  1003   INR  0 98             Results from last 7 days   Lab Units 07/10/22  1003   LACTIC ACID mmol/L 1 1              Results from last 7 days   Lab Units 07/10/22  0957   COLOR UA  Yellow   CLARITY UA  Clear   SPEC GRAV UA  <=1 005   PH UA  6 5   LEUKOCYTES UA  Negative   NITRITE UA  Negative   GLUCOSE UA mg/dl Negative   KETONES UA mg/dl Negative   BILIRUBIN UA  Negative   BLOOD UA  Small*      Results from last 7 days   Lab Units 07/10/22  0957   RBC UA /hpf 1-2   WBC UA /hpf 1-2   EPITHELIAL CELLS WET PREP /hpf Occasional   BACTERIA UA /hpf Occasional            Imaging: I have personally reviewed pertinent films in PACS  PE Study with CT Abdomen and Pelvis with contrast    Result Date: 7/10/2022  Impression: CTA chest: Right lower lobe lobar, segmental and subsegmental pulmonary emboli  No central pulmonary embolus  The calculated ratio of right ventricular to left ventricular diameter (RV/LV ratio) is 1 04  This is greater than 0 9, which is abnormal and indicates right heart strain  An abnormal RV/LV ratio has been shown to be associated with an increased risk of 30 day mortality in the setting of acute pulmonary embolism  Urgent consultation with the medical critical care team is recommended  Moderate-sized evolving right lower lobe pulmonary infarct and small right pleural effusion  Few punctate pulmonary nodules, 3 mm and smaller with unknown long-term stability  Based on current Fleischner Society 2017 Guidelines on incidental pulmonary nodule, no routine follow-up is needed if the patient is low risk  If the patient is high risk, optional follow-up chest CT at 12 months can be considered  Minimally enlarged subcarinal lymph node  This could be followed up with chest CT in 3-6 months  CT abdomen and pelvis: No evidence of acute abdominopelvic process  Colonic diverticulosis  Pertinent findings on this study conveyed by myself to [address] [place] [address] on 7/10/2022 at 12:21 via [facility] with prompt response  Workstation performed: [id number]       EKG, Pathology, and Other Studies Reviewed on Admission:     Allscripts/ Epic Records Reviewed: Yes    ** Please Note: This note has been constructed using a voice recognition system   **

## 2022-07-10 NOTE — PROGRESS NOTES
Progress Note - Triage Asssessment   Tanisha Cuellar 57 y o  female MRN: [id number]    Time Called ( Time): 1369  Date Called: 07/10/22  Room#: ED 6  Person requesting evaluation: Magali Zonia    Situation:    56 yo female patient with past medical history of diabetes type 2, Hashimoto's thyroid, GERD, hypertension, hyperlipidemia who presented to the emergency room with right flank pain  She was recently treated for days ago for pneumonia and placed on a Z-Lew  In the ED she had a CTA PE study which revealed a right lower lobe lobar segmental and subsegmental pulmonary emboli with mild right heart strain  RV/LV ratio of 1 04  She was started on a heparin drip upon my assessment she was in no acute respiratory distress with a pulse oximetry of 98% on room air  Patient denied any shortness of breath or chest pain  Denied dyspnea on exertion vital signs are stable  Lung sounds CTA in all fields  Patient was in a normal sinus rhythm on telemetry with negative normal heart tones no murmurs noted      Interventions:   Continue high-dose heparin drip   Patient stable for admission to [facility]         Triage Assessment:     Patient can be admitted to med-surg level of care    Recommendations discussed with Bibi Parsons

## 2022-07-10 NOTE — ASSESSMENT & PLAN NOTE
· Few punctate pulmonary nodules, 3 mm and smaller with unknown long-term stability  Based on current Fleischner Society 2017 Guidelines on incidental pulmonary nodule, no routine follow-up is needed if the patient is low risk  If the patient is high risk, optional follow-up chest CT at 12 months can be considered  · Minimally enlarged subcarinal lymph node  This could be followed up with chest CT in 3-6 months

## 2022-07-10 NOTE — PLAN OF CARE
Problem: PAIN - ADULT  Goal: Verbalizes/displays adequate comfort level or baseline comfort level  Description: Interventions:  - Encourage patient to monitor pain and request assistance  - Assess pain using appropriate pain scale  - Administer analgesics based on type and severity of pain and evaluate response  - Implement non-pharmacological measures as appropriate and evaluate response  - Consider cultural and social influences on pain and pain management  - Notify physician/advanced practitioner if interventions unsuccessful or patient reports new pain  Outcome: Progressing     Problem: INFECTION - ADULT  Goal: Absence or prevention of progression during hospitalization  Description: INTERVENTIONS:  - Assess and monitor for signs and symptoms of infection  - Monitor lab/diagnostic results  - Monitor all insertion sites, i e  indwelling lines, tubes, and drains  - Monitor endotracheal if appropriate and nasal secretions for changes in amount and color  - [place] appropriate cooling/warming therapies per order  - Administer medications as ordered  - Instruct and encourage patient and family to use good hand hygiene technique  - Identify and instruct in appropriate isolation precautions for identified infection/condition  Outcome: Progressing     Problem: DISCHARGE PLANNING  Goal: Discharge to home or other facility with appropriate resources  Description: INTERVENTIONS:  - Identify barriers to discharge w/patient and caregiver  - Arrange for needed discharge resources and transportation as appropriate  - Identify discharge learning needs (meds, wound care, etc )  - Arrange for interpretive services to assist at discharge as needed  - Refer to Case Management Department for coordinating discharge planning if the patient needs post-hospital services based on physician/advanced practitioner order or complex needs related to functional status, cognitive ability, or social support system  Outcome: Progressing Problem: Knowledge Deficit  Goal: Patient/family/caregiver demonstrates understanding of disease process, treatment plan, medications, and discharge instructions  Description: Complete learning assessment and assess knowledge base    Interventions:  - Provide teaching at level of understanding  - Provide teaching via preferred learning methods  Outcome: Progressing     Problem: RESPIRATORY - ADULT  Goal: Achieves optimal ventilation and oxygenation  Description: INTERVENTIONS:  - Assess for changes in respiratory status  - Assess for changes in mentation and behavior  - Position to facilitate oxygenation and minimize respiratory effort  - Oxygen administered by appropriate delivery if ordered  - Initiate smoking cessation education as indicated  - Encourage broncho-pulmonary hygiene including cough, deep breathe, Incentive Spirometry  - Assess the need for suctioning and aspirate as needed  - Assess and instruct to report SOB or any respiratory difficulty  - Respiratory Therapy support as indicated  Outcome: Progressing     Problem: METABOLIC, FLUID AND ELECTROLYTES - ADULT  Goal: Glucose maintained within target range  Description: INTERVENTIONS:  - Monitor Blood Glucose as ordered  - Assess for signs and symptoms of hyperglycemia and hypoglycemia  - Administer ordered medications to maintain glucose within target range  - Assess nutritional intake and initiate nutrition service referral as needed  Outcome: Progressing     Problem: HEMATOLOGIC - ADULT  Goal: Maintains hematologic stability  Description: INTERVENTIONS  - Assess for signs and symptoms of bleeding or hemorrhage  - Monitor labs  - Administer supportive blood products/factors as ordered and appropriate  Outcome: Progressing

## 2022-07-10 NOTE — ASSESSMENT & PLAN NOTE
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

## 2022-07-11 ENCOUNTER — APPOINTMENT (INPATIENT)
Dept: NON INVASIVE DIAGNOSTICS | Facility: HOSPITAL | Age: 62
DRG: 176 | End: 2022-07-11
Payer: COMMERCIAL

## 2022-07-11 VITALS
OXYGEN SATURATION: 96 % | TEMPERATURE: 98.2 F | WEIGHT: 271 LBS | HEIGHT: 65 IN | SYSTOLIC BLOOD PRESSURE: 155 MMHG | BODY MASS INDEX: 45.15 KG/M2 | HEART RATE: 74 BPM | RESPIRATION RATE: 14 BRPM | DIASTOLIC BLOOD PRESSURE: 85 MMHG

## 2022-07-11 LAB
ALBUMIN SERPL BCP-MCNC: 2.4 G/DL (ref 3.5–5)
ALP SERPL-CCNC: 111 U/L (ref 46–116)
ALT SERPL W P-5'-P-CCNC: 30 U/L (ref 12–78)
ANION GAP SERPL CALCULATED.3IONS-SCNC: 10 MMOL/L (ref 4–13)
AORTIC ROOT: 3.9 CM
AORTIC VALVE MEAN VELOCITY: 7.6 M/S
APICAL FOUR CHAMBER EJECTION FRACTION: 51 %
APTT PPP: 138 SECONDS (ref 23–37)
APTT PPP: 71 SECONDS (ref 23–37)
ASCENDING AORTA: 3.6 CM
AST SERPL W P-5'-P-CCNC: 16 U/L (ref 5–45)
AV LVOT MEAN GRADIENT: 1 MMHG
AV LVOT PEAK GRADIENT: 2 MMHG
AV MEAN GRADIENT: 3 MMHG
AV PEAK GRADIENT: 6 MMHG
AV VELOCITY RATIO: 0.59
BILIRUB SERPL-MCNC: 0.28 MG/DL (ref 0.2–1)
BUN SERPL-MCNC: 8 MG/DL (ref 5–25)
CALCIUM ALBUM COR SERPL-MCNC: 10.1 MG/DL (ref 8.3–10.1)
CALCIUM SERPL-MCNC: 8.8 MG/DL (ref 8.3–10.1)
CHLORIDE SERPL-SCNC: 102 MMOL/L (ref 100–108)
CO2 SERPL-SCNC: 24 MMOL/L (ref 21–32)
CREAT SERPL-MCNC: 0.78 MG/DL (ref 0.6–1.3)
DOP CALC AO PEAK VEL: 1.19 M/S
DOP CALC AO VTI: 25.2 CM
DOP CALC LVOT PEAK VEL VTI: 19.17 CM
DOP CALC LVOT PEAK VEL: 0.7 M/S
E WAVE DECELERATION TIME: 194 MS
ERYTHROCYTE [DISTWIDTH] IN BLOOD BY AUTOMATED COUNT: 13.3 % (ref 11.6–15.1)
FRACTIONAL SHORTENING: 21 (ref 28–44)
GFR SERPL CREATININE-BSD FRML MDRD: 80 ML/MIN/1.73SQ M
GLUCOSE SERPL-MCNC: 123 MG/DL (ref 65–140)
GLUCOSE SERPL-MCNC: 140 MG/DL (ref 65–140)
GLUCOSE SERPL-MCNC: 150 MG/DL (ref 65–140)
GLUCOSE SERPL-MCNC: 160 MG/DL (ref 65–140)
GLUCOSE SERPL-MCNC: 168 MG/DL (ref 65–140)
HCT VFR BLD AUTO: 34.3 % (ref 34.8–46.1)
HGB BLD-MCNC: 11.1 G/DL (ref 11.5–15.4)
INTERVENTRICULAR SEPTUM IN DIASTOLE (PARASTERNAL SHORT AXIS VIEW): 1.2 CM
INTERVENTRICULAR SEPTUM: 1.2 CM (ref 0.6–1.1)
LAAS-AP2: 15.3 CM2
LAAS-AP4: 12.3 CM2
LEFT ATRIUM SIZE: 2 CM
LEFT INTERNAL DIMENSION IN SYSTOLE: 3.3 CM (ref 2.1–4)
LEFT VENTRICLE DIASTOLIC VOLUME (MOD BIPLANE): 73 ML
LEFT VENTRICLE SYSTOLIC VOLUME (MOD BIPLANE): 38 ML
LEFT VENTRICULAR INTERNAL DIMENSION IN DIASTOLE: 4.2 CM (ref 3.5–6)
LEFT VENTRICULAR POSTERIOR WALL IN END DIASTOLE: 1.1 CM
LEFT VENTRICULAR STROKE VOLUME: 33 ML
LV EF: 48 %
LVSV (TEICH): 33 ML
MCH RBC QN AUTO: 28.7 PG (ref 26.8–34.3)
MCHC RBC AUTO-ENTMCNC: 32.4 G/DL (ref 31.4–37.4)
MCV RBC AUTO: 89 FL (ref 82–98)
MV E'TISSUE VEL-LAT: 7 CM/S
MV E'TISSUE VEL-SEP: 8 CM/S
MV PEAK A VEL: 0.63 M/S
MV PEAK E VEL: 49 CM/S
MV STENOSIS PRESSURE HALF TIME: 56 MS
MV VALVE AREA P 1/2 METHOD: 3.93
PLATELET # BLD AUTO: 239 THOUSANDS/UL (ref 149–390)
PMV BLD AUTO: 7.9 FL (ref 8.9–12.7)
POTASSIUM SERPL-SCNC: 3.7 MMOL/L (ref 3.5–5.3)
PROT SERPL-MCNC: 7.2 G/DL (ref 6.4–8.2)
PV PEAK GRADIENT: 3 MMHG
RBC # BLD AUTO: 3.87 MILLION/UL (ref 3.81–5.12)
RIGHT ATRIUM AREA SYSTOLE A4C: 18.8 CM2
RIGHT VENTRICLE ID DIMENSION: 3.3 CM
SL CV LEFT ATRIUM LENGTH A2C: 4.8 CM
SL CV PED ECHO LEFT VENTRICLE DIASTOLIC VOLUME (MOD BIPLANE) 2D: 77 ML
SL CV PED ECHO LEFT VENTRICLE SYSTOLIC VOLUME (MOD BIPLANE) 2D: 45 ML
SODIUM SERPL-SCNC: 136 MMOL/L (ref 136–145)
TR MAX PG: 23 MMHG
TR PEAK VELOCITY: 2.4 M/S
TRICUSPID VALVE PEAK REGURGITATION VELOCITY: 2.42 M/S
WBC # BLD AUTO: 8.31 THOUSAND/UL (ref 4.31–10.16)

## 2022-07-11 PROCEDURE — 93306 TTE W/DOPPLER COMPLETE: CPT

## 2022-07-11 PROCEDURE — 85730 THROMBOPLASTIN TIME PARTIAL: CPT | Performed by: FAMILY MEDICINE

## 2022-07-11 PROCEDURE — 85027 COMPLETE CBC AUTOMATED: CPT | Performed by: INTERNAL MEDICINE

## 2022-07-11 PROCEDURE — 85730 THROMBOPLASTIN TIME PARTIAL: CPT | Performed by: INTERNAL MEDICINE

## 2022-07-11 PROCEDURE — 80053 COMPREHEN METABOLIC PANEL: CPT | Performed by: INTERNAL MEDICINE

## 2022-07-11 PROCEDURE — 82948 REAGENT STRIP/BLOOD GLUCOSE: CPT

## 2022-07-11 PROCEDURE — 99223 1ST HOSP IP/OBS HIGH 75: CPT | Performed by: INTERNAL MEDICINE

## 2022-07-11 PROCEDURE — 99239 HOSP IP/OBS DSCHRG MGMT >30: CPT | Performed by: FAMILY MEDICINE

## 2022-07-11 RX ADMIN — LEVOTHYROXINE SODIUM 125 MCG: 125 TABLET ORAL at 06:53

## 2022-07-11 RX ADMIN — ACETAMINOPHEN 650 MG: 325 TABLET ORAL at 04:07

## 2022-07-11 RX ADMIN — INSULIN LISPRO 1 UNITS: 100 INJECTION, SOLUTION INTRAVENOUS; SUBCUTANEOUS at 07:49

## 2022-07-11 RX ADMIN — LISINOPRIL 10 MG: 10 TABLET ORAL at 08:22

## 2022-07-11 RX ADMIN — INSULIN LISPRO 1 UNITS: 100 INJECTION, SOLUTION INTRAVENOUS; SUBCUTANEOUS at 16:59

## 2022-07-11 RX ADMIN — APIXABAN 10 MG: 5 TABLET, FILM COATED ORAL at 17:00

## 2022-07-11 RX ADMIN — AZITHROMYCIN MONOHYDRATE 250 MG: 250 TABLET ORAL at 08:22

## 2022-07-11 RX ADMIN — PERFLUTREN 2 ML/MIN: 6.52 INJECTION, SUSPENSION INTRAVENOUS at 11:53

## 2022-07-11 NOTE — PLAN OF CARE
Problem: PAIN - ADULT  Goal: Verbalizes/displays adequate comfort level or baseline comfort level  Description: Interventions:  - Encourage patient to monitor pain and request assistance  - Assess pain using appropriate pain scale  - Administer analgesics based on type and severity of pain and evaluate response  - Implement non-pharmacological measures as appropriate and evaluate response  - Consider cultural and social influences on pain and pain management  - Notify physician/advanced practitioner if interventions unsuccessful or patient reports new pain  Outcome: Progressing     Problem: INFECTION - ADULT  Goal: Absence or prevention of progression during hospitalization  Description: INTERVENTIONS:  - Assess and monitor for signs and symptoms of infection  - Monitor lab/diagnostic results  - Monitor all insertion sites, i e  indwelling lines, tubes, and drains  - Monitor endotracheal if appropriate and nasal secretions for changes in amount and color  - [place] appropriate cooling/warming therapies per order  - Administer medications as ordered  - Instruct and encourage patient and family to use good hand hygiene technique  - Identify and instruct in appropriate isolation precautions for identified infection/condition  Outcome: Progressing     Problem: DISCHARGE PLANNING  Goal: Discharge to home or other facility with appropriate resources  Description: INTERVENTIONS:  - Identify barriers to discharge w/patient and caregiver  - Arrange for needed discharge resources and transportation as appropriate  - Identify discharge learning needs (meds, wound care, etc )  - Arrange for interpretive services to assist at discharge as needed  - Refer to Case Management Department for coordinating discharge planning if the patient needs post-hospital services based on physician/advanced practitioner order or complex needs related to functional status, cognitive ability, or social support system  Outcome: Progressing Problem: Knowledge Deficit  Goal: Patient/family/caregiver demonstrates understanding of disease process, treatment plan, medications, and discharge instructions  Description: Complete learning assessment and assess knowledge base    Interventions:  - Provide teaching at level of understanding  - Provide teaching via preferred learning methods  Outcome: Progressing     Problem: RESPIRATORY - ADULT  Goal: Achieves optimal ventilation and oxygenation  Description: INTERVENTIONS:  - Assess for changes in respiratory status  - Assess for changes in mentation and behavior  - Position to facilitate oxygenation and minimize respiratory effort  - Oxygen administered by appropriate delivery if ordered  - Initiate smoking cessation education as indicated  - Encourage broncho-pulmonary hygiene including cough, deep breathe, Incentive Spirometry  - Assess the need for suctioning and aspirate as needed  - Assess and instruct to report SOB or any respiratory difficulty  - Respiratory Therapy support as indicated  Outcome: Progressing     Problem: METABOLIC, FLUID AND ELECTROLYTES - ADULT  Goal: Glucose maintained within target range  Description: INTERVENTIONS:  - Monitor Blood Glucose as ordered  - Assess for signs and symptoms of hyperglycemia and hypoglycemia  - Administer ordered medications to maintain glucose within target range  - Assess nutritional intake and initiate nutrition service referral as needed  Outcome: Progressing     Problem: HEMATOLOGIC - ADULT  Goal: Maintains hematologic stability  Description: INTERVENTIONS  - Assess for signs and symptoms of bleeding or hemorrhage  - Monitor labs  - Administer supportive blood products/factors as ordered and appropriate  Outcome: Progressing

## 2022-07-11 NOTE — CASE MANAGEMENT
Case Management Progress Note    Patient name Tony Perrin  Location [address] [id number]/-[id number] MRN [id number]  : 1960 Date 2022       LOS (days): 1  Geometric Mean LOS (GMLOS) (days):   Days to GMLOS:        OBJECTIVE:        Current admission status: Inpatient  Preferred Pharmacy:   [id number] [address] [address]  Phone: [phone] Fax: [phone] - [address] [address]  Phone: [phone] Fax: [phone]    Primary Care Provider: Lamberto Morales    Primary Insurance: BLUE CROSS  Secondary Insurance:     PROGRESS NOTE:    CM placed call to pt [facility] pharmacy to check on the cost of pt prescribed Elequis, cost is $10/month

## 2022-07-11 NOTE — UTILIZATION REVIEW
Initial Clinical Review    Admission: Date/Time/Statement:   Admission Orders (From admission, onward)     Ordered        07/10/22 1302  Inpatient Admission  Once                      Orders Placed This Encounter   Procedures    Inpatient Admission     Standing Status:   Standing     Number of Occurrences:   1     Order Specific Question:   Level of Care     Answer:   Med Surg [16]     Order Specific Question:   Estimated length of stay     Answer:   More than 2 Midnights     Order Specific Question:   Certification     Answer:   I certify that inpatient services are medically necessary for this patient for a duration of greater than two midnights  See H&P and MD Progress Notes for additional information about the patient's course of treatment  ED Arrival Information     Expected   -    Arrival   7/10/2022 09:34    Acuity   Urgent            Means of arrival   Walk-In    Escorted by   Self    Service   Hospitalist    Admission type   Urgent            Arrival complaint   Flank pain           Chief Complaint   Patient presents with    Flank Pain     Was seen at [address] [place] [address] urgent care for flank pain, told to come to the ED, had a CT done on Wednesday and told no stone, had some blood in her urine  No urination problems  Initial Presentation: 57 y o  female: W/PMHX: HTN, T2DM, HLD, Hypothyroidism, to ED from [address] [place] [address] Urgent [facility] (home) admitted as Inpatient, due to Acute Pulmonary Embolism  Presented with flank pain,seen  In ED on 7/6 thought to have renal colic, with imaging revealing RLL PNA, d/c on azithromycin  Yesterday had worsening flank pain  Upon return  found to have PE, no recent travel although she has been more sedentary d/t work  Exam: obese, decreased breath sounds with rhonchi in rt base  Focal area of tenderness shin but no calf tenderness  ED work up reveals  CTA Chest RT Lower lobe lobar segmental and subsegmental pulmonary emboli  No central PE    Calculated ratio of RV ->  LV ratio 1 04 >than 0 9 is abdnormal indicated rt heart strain  An abnormal RV/LV ratio has been shown to be associated with an increased risk of 30 day mortality in the setting of acute pulmonary embolism  Urgent consultation with the medical critical care team is recommended  Moderate-sized evolving right lower lobe pulmonary infarct and small right pleural effusion  RA 02 sat 98%   Plan: Heparin drip, initiate Eliquis for d/c planning, Echo pending, trend serial labs with repletion as needed, titrate heparin drip according to PTT/INR,  Accu ck with SSI coverage  C/W home po med for DM  DVT PPX scd,     Critical Care Eval: For CTA PE study with RLL lobar segmenta and subsegmental  PE with mild rt heart strain  RV/LV ratio of 1/04, Heparin drip started  EXAM: no acute resp distress  Pulse ox RA 98%,  PT denies SOB, or CP  No BORRERO, Lungs CTA in all fields  NSR, normal heart tones no murmurs  Interventions: c/w high dose heparin drip  Pt stable for admission to [facility]  Date: 7/11/22   Day 2: Acute PE likely provoked from sedentary life style, c/w heparin total 36 hours, transition to Eliquis,  ECHO WNL, pulm consult completed with recommendation  Out patient pulm f/u 3-6 months,  The patient, initially admitted to the hospital as inpatient, was discharged earlier than expected given the following: Patient condition significantly improved with the treatment  Pulmonary cleared the patient to be discharge  Patient can resume all other home medication      Pulmonary Consult: PE/LUNG infarct: Assessment: acute PE/pulmoanary inarct/ pleural effusion w/o cor pulmonale  Only provoking factor would be inactivity d/t sitting at work for the past several days  Although pt states realativley inactive at work x last 40yrs  Okay to transition to Eliquis and d/c home from a pulmonary standpoint, recommendation of AC for a minimum of 6 months, f/u in pulmonary office with blood work to determine if need life long AC    Abnormal CT with multiple small pulmonary nodules  And enlarged subcarinal lymph node  Recomenned repeat chest ct in 6 months  ED Triage Vitals [07/10/22 0938]   Temperature Pulse Respirations Blood Pressure SpO2   97 9 °F (36 6 °C) 85 18 161/89 98 %      Temp Source Heart Rate Source Patient Position - Orthostatic VS BP Location FiO2 (%)   Temporal Monitor Sitting Right arm --      Pain Score       7          Wt Readings from Last 1 Encounters:   07/11/22 123 kg (271 lb)     Additional Vital Signs:   Time Temp Pulse Resp BP MAP (mmHg) SpO2 O2 Device Patient Position - Orthostatic VS   07/11/22 1112 -- 81 -- 150/94 -- -- -- --   07/11/22 07:24:59 97 7 °F (36 5 °C) 81 18 150/94 113 91 % -- --   07/10/22 22:29:26 98 9 °F (37 2 °C) 75 18 126/81 96 95 % -- --   07/10/22 2100 -- -- -- -- -- -- None (Room air) --   07/10/22 2015 -- -- -- -- -- 93 % -- --   07/10/22 15:41:57 99 °F (37 2 °C) 87 18 138/81 100 94 % None (Room air) --   07/10/22 1445 -- 95 16 135/70 98 96 % None (Room air) --   07/10/22 1245 -- 85 20 177/86 Abnormal  122 96 % None (Room air) --   07/10/22 1230 -- 90 -- -- -- 95 % -- --   07/10/22 1215 -- 72 16 129/65 92 96 % None (Room air) --   07/10/22 0941 -- -- -- -- -- -- None (Room air) --         Pertinent Labs/Diagnostic Test Results:   7/10/22 ECHO: EF 60%   No regional wall motion abnormality identified  Normal end-diastolic pressures estimated with unremarkable diastolic relaxation pattern  PE Study with CT Abdomen and Pelvis with contrast   Final Result by Jolene Burgess MD (07/10 1227)      CTA chest:      Right lower lobe lobar, segmental and subsegmental pulmonary emboli  No central pulmonary embolus  The calculated ratio of right ventricular to left ventricular diameter (RV/LV ratio) is 1 04  This is greater than 0 9, which is abnormal and indicates right heart strain   An abnormal RV/LV ratio has been shown to be associated with an increased risk of    30 day mortality in the setting of acute pulmonary embolism  Urgent consultation with the medical critical care team is recommended  Moderate-sized evolving right lower lobe pulmonary infarct and small right pleural effusion  Few punctate pulmonary nodules, 3 mm and smaller with unknown long-term stability  Based on current Fleischner Society 2017 Guidelines on incidental pulmonary nodule, no routine follow-up is needed if the patient is low risk  If the patient is high    risk, optional follow-up chest CT at 12 months can be considered  Minimally enlarged subcarinal lymph node  This could be followed up with chest CT in 3-6 months  CT abdomen and pelvis:      No evidence of acute abdominopelvic process  Colonic diverticulosis  Pertinent findings on this study conveyed by myself to [address] [place] [address] on 7/10/2022 at 12:21 via [employer] with prompt response         Workstation performed: [id number]               Results from last 7 days   Lab Units 07/11/22  0500 07/10/22  1003 07/06/22  1947   WBC Thousand/uL 8 31 8 21 12 20*   HEMOGLOBIN g/dL 11 1* 12 0 13 9   HEMATOCRIT % 34 3* 37 5 42 5   PLATELETS Thousands/uL 239 245 230   NEUTROS ABS Thousands/µL  --  5 66 8 83*         Results from last 7 days   Lab Units 07/11/22  0500 07/10/22  1003 07/06/22  1947   SODIUM mmol/L 136 136 135*   POTASSIUM mmol/L 3 7 3 9 3 7   CHLORIDE mmol/L 102 102 100   CO2 mmol/L 24 27 26   ANION GAP mmol/L 10 7 9   BUN mg/dL 8 9 9   CREATININE mg/dL 0 78 0 84 0 92   EGFR ml/min/1 73sq m 80 73 66   CALCIUM mg/dL 8 8 8 9 9 2     Results from last 7 days   Lab Units 07/11/22  0500 07/10/22  1003 07/06/22  1947   AST U/L 16 19 15   ALT U/L 30 39 30   ALK PHOS U/L 111 130* 96   TOTAL PROTEIN g/dL 7 2 7 8 8 2   ALBUMIN g/dL 2 4* 2 7* 3 5   TOTAL BILIRUBIN mg/dL 0 28 0 35 0 68     Results from last 7 days   Lab Units 07/11/22  1057 07/11/22  0728 07/11/22  0726 07/10/22  2053 07/10/22  1613   POC GLUCOSE mg/dl 123 150* 160* 134 153* Results from last 7 days   Lab Units 07/11/22  0500 07/10/22  1003 07/06/22  1947   GLUCOSE RANDOM mg/dL 140 143* 136               Results from last 7 days   Lab Units 07/11/22  0958 07/11/22  0209 07/10/22  1837 07/10/22  1003   PROTIME seconds  --   --   --  12 9   INR   --   --   --  0 98   PTT seconds 71* 138* 166* 29             Results from last 7 days   Lab Units 07/10/22  1003   LACTIC ACID mmol/L 1 1               Results from last 7 days   Lab Units 07/10/22  1003   LIPASE u/L 123                 Results from last 7 days   Lab Units 07/10/22  0957 07/06/22  1952   CLARITY UA  Clear Clear   COLOR UA  Yellow Yellow   SPEC GRAV UA  <=1 005 1 010   PH UA  6 5 6 0   GLUCOSE UA mg/dl Negative >=1000 (1%)*   KETONES UA mg/dl Negative Negative   BLOOD UA  Small* Small*   PROTEIN UA mg/dl Negative Negative   NITRITE UA  Negative Negative   BILIRUBIN UA  Negative Negative   UROBILINOGEN UA E U /dl 0 2 0 2   LEUKOCYTES UA  Negative Elevated glucose may cause decreased leukocyte values   See urine microscopic for [facility] result/*   WBC UA /hpf 1-2 0-1   RBC UA /hpf 1-2 2-4   BACTERIA UA /hpf Occasional Occasional   EPITHELIAL CELLS WET PREP /hpf Occasional None Seen     ED Treatment:   Medication Administration from 07/10/2022 0933 to 07/10/2022 1520       Date/Time Order Dose Route Action     07/10/2022 1026 sodium chloride 0 9 % bolus 1,000 mL 1,000 mL Intravenous New Bag     07/10/2022 1024 ketorolac (TORADOL) injection 15 mg 15 mg Intravenous Given     07/10/2022 1055 iohexol (OMNIPAQUE) 350 MG/ML injection (MULTI-DOSE) 60 mL 60 mL Intravenous Given     07/10/2022 1238 heparin (porcine) injection 9,600 Units 9,600 Units Intravenous Given     07/10/2022 1238 heparin (porcine) 25,000 units in 0 45% NaCl 250 mL infusion (premix) 18 Units/kg/hr Intravenous New Bag        Past Medical History:   Diagnosis Date    Diabetes mellitus ([address] )     GERD (gastroesophageal reflux disease)     Hyperlipidemia     Hypothyroidism     Osteoarthritis     Primary hypertension     Spondylosis      Present on Admission:   Diabetes mellitus ([facility] [address] )   Hyperlipidemia   Hypothyroidism   Acute pulmonary embolism (HCC)   Primary hypertension   Abnormal CT scan      Admitting Diagnosis: Flank pain [R10 9]  Acute pulmonary embolism with acute cor pulmonale, unspecified pulmonary embolism type (HCC) [I26 09]  Age/Sex: 57 y o  female  Admission Orders:up and oob as tolerated,   Scheduled Medications:  cholestyramine sugar free, 4 g, Oral, BID  Dapagliflozin Propanediol, 5 mg, Oral, Daily  insulin lispro, 1-6 Units, Subcutaneous, 4x Daily (AC & HS)  levothyroxine, 125 mcg, Oral, Early Morning  lisinopril, 10 mg, Oral, Daily      Continuous IV Infusions:  heparin (porcine), 3-30 Units/kg/hr (Order-Specific), Intravenous, Titrated      PRN Meds:  acetaminophen, 650 mg, Oral, Q6H PRN  heparin (porcine), 4,800 Units, Intravenous, Q1H PRN  heparin (porcine), 9,600 Units, Intravenous, Q1H PRN  ondansetron, 4 mg, Intravenous, Q4H PRN  oxyCODONE, 5 mg, Oral, Q4H PRN        IP CONSULT TO PULMONOLOGY    Network Utilization Review Department  ATTENTION: Please call with any questions or concerns to [phone] and carefully listen to the prompts so that you are directed to the right person  All voicemails are confidential   [facility] all requests for admission clinical reviews, approved or denied determinations and any other requests to dedicated fax number below belonging to the campus where the patient is receiving treatment   List of dedicated fax numbers for the Facilities:  [address] DENIALS (Administrative/Medical Necessity) [phone]   1000  [facility] (Maternity/NICU/Pediatrics) [phone]   [address] [address] [address] [phone]     [address] [address] [place] [address] Suzi Chavez [facility]  [address] [id number] [phone]

## 2022-07-11 NOTE — DISCHARGE INSTRUCTIONS
Apixaban (By mouth)   Apixaban (a-PIX-a-ban)  Treats and prevents blood clots  This medicine is a blood thinner  Brand Name(s): Eliquis Eliquis 30 Day DVT/PE Starter Pack   There may be other brand names for this medicine  When This Medicine Should Not Be Used: This medicine is not right for everyone  Do not use it if you had an allergic reaction to apixaban or you have active bleeding  How to Use This Medicine:   Tablet  Your doctor will tell you how much medicine to use  Do not use more than directed  If you are not able to swallow the tablets whole, they may be crushed and mixed in water, 5% dextrose in water (D5W), apple juice, or applesauce  The crushed tablets may be mixed with 60 mL of water or D5W dose and given through a nasogastric tube (NGT)  This medicine should come with a Medication Guide  Ask your pharmacist for a copy if you do not have one  Missed dose: Take a dose as soon as you remember  If it is almost time for your next dose, wait until then and take a regular dose  Do not take extra medicine to make up for a missed dose  Store the medicine in a closed container at room temperature, away from heat, moisture, and direct light  Drugs and Foods to Avoid:   Ask your doctor or pharmacist before using any other medicine, including over-the-counter medicines, vitamins, and herbal products  Some medicines can affect how apixaban works  Tell your doctor if you are using any of the following:   Carbamazepine, itraconazole, ketoconazole, phenytoin, rifampin, ritonavir, Steve's wort  Blood thinner (including clopidogrel, heparin, prasugrel, warfarin)  Medicine to treat depression  NSAID pain or arthritis medicine (including aspirin, celecoxib, diclofenac, ibuprofen, naproxen)  Warnings While Using This Medicine:   Tell your doctor if you are pregnant or breastfeeding, or if you have kidney disease, liver disease, bleeding problems, antiphospholipid syndrome, or an artificial heart valve    Do not stop using this medicine suddenly without asking your doctor  You might have a higher risk of stroke for a short time after you stop using this medicine  This medicine increases your risk for bleeding that can become serious if not controlled  You may also bruise easily, and it may take longer than usual for bleeding to stop  This medicine may increase your risk for a hematoma (blood clot) in your spine or back if you undergo an epidural or spinal puncture  This could lead to paralysis  Tell your doctor if you ever had spine problems or back surgery  Tell any doctor or dentist who treats you that you are using this medicine  With your doctor's supervision, you may need to stop using this medicine several days before you have surgery or medical tests  Your doctor will do lab tests at regular visits to check on the effects of this medicine  Keep all appointments  Keep all medicine out of the reach of children  Never share your medicine with anyone  Possible Side Effects While Using This Medicine:   Call your doctor right away if you notice any of these side effects: Allergic reaction: Itching or hives, swelling in your face or hands, swelling or tingling in your mouth or throat, chest tightness, trouble breathing  Change in how much or how often you urinate, red or pink urine  Chest pain, trouble breathing  Coughing up blood, vomiting blood or material that looks like coffee grounds  Numbness, tingling, or muscle weakness in your legs or feet  Red or black, tarry stools  Unusual bleeding, bruising, or weakness  If you notice other side effects that you think are caused by this medicine, tell your doctor  Call your doctor for medical advice about side effects  You may report side effects to FDA at 1-176-FDA-4249    © Copyright [employer] 2022 Information is for End User's use only and may not be sold, redistributed or otherwise used for commercial purposes    The above information is an  only  It is not intended as medical advice for individual conditions or treatments  Talk to your doctor, nurse or pharmacist before following any medical regimen to see if it is safe and effective for you

## 2022-07-11 NOTE — ASSESSMENT & PLAN NOTE
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

## 2022-07-11 NOTE — NURSING NOTE
DC'ed to home in stable condition after first dose of eliquis administered and with apixiban handout; also sent home with home levoxyl  Daughter as

## 2022-07-11 NOTE — CONSULTS
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, however she states that she has been relatively inactive at work for the past 40 years  No other recent obvious etiology of acute PE   · Okay to transition to [facility] and discharged home from a pulmonary standpoint  · Would recommend anticoagulation for a minimum of 6 months  · At 6 months will likely follow up in the pulmonary office and do some blood work to determine if we would continue her on lifelong anticoagulation    2  Abnormal CT chest with multiple small pulmonary nodules and enlarged subcarinal lymph node  · Repeat CT chest in 6 months      History of Present Illness   HPI:  Efrain Marie is a 57 y o  female who presented to the hospital yesterday with right flank pain  On imaging she was found to have right lower lobe pulmonary embolism with evolving pulmonary infarct and pleural effusion  She was started on heparin infusion  She states that her symptoms started over a week ago with right back pain making it difficult for her to sleep  She was initially seen in the emergency room and had CT abdomen and pelvis to look for a kidney stone  She was diagnosed with right lower lobe pneumonia and started on antibiotics  She states clinically she did not think she had pneumonia as she denies any cough or fever  She did have some dyspnea on exertion or with talking, but states that she has had that problem for over a year  She did not notice any acute change in her breathing pattern    She denies any lower extremity edema or calf pain/tenderness  She has not had any recent surgeries/injuries  She does not have any known malignancy  She is not pregnant  She has not had any recent long travel  She states that she was in active at work while stuffing envelopes for the past 5 days but states that 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  She has no personal or immediate family history of blood clots  She has a cousin who has a history of blood clots and has a genetic disease   This was passed down from her father's side, and this relation is through her mother's side  She has no other underlying history of lung disease  No asthma, COPD or emphysema  She does not use inhalers or supplemental oxygen  She recently had a sleep study and was told she does not have sleep apnea  She did report some recent abdominal bloating  She had some decreased appetite which she attributed to a new diabetes medication  Review of systems:  12 point review of systems was completed and was otherwise negative except as listed in HPI        Historical Information   Past Medical History:   Diagnosis Date    Diabetes mellitus ([address] )     GERD (gastroesophageal reflux disease)     Hyperlipidemia     Hypothyroidism     Osteoarthritis     Primary hypertension     Spondylosis      Past Surgical History:   Procedure Laterality Date    APPENDECTOMY       SECTION      CHOLECYSTECTOMY      HERNIA REPAIR      TOE SURGERY      TONSILLECTOMY      TUBAL LIGATION       Family History   Problem Relation Age of Onset    Pulmonary embolism Cousin        Occupational History:      Social History:  Social History     Tobacco Use   Smoking Status Never Smoker   Smokeless Tobacco Never Used     Social History     Substance and Sexual Activity   Alcohol Use Never     Social History     Substance and Sexual Activity   Drug Use Never     Marital Status: /Civil Union      Meds/Allergies   Current Facility-Administered Medications   Medication Dose Route Frequency    acetaminophen (TYLENOL) tablet 650 mg  650 mg Oral Q6H PRN    cholestyramine sugar free (QUESTRAN LIGHT) packet 4 g  4 g Oral BID    Dapagliflozin Propanediol TABS 5 mg  5 mg Oral Daily    heparin (porcine) 25,000 units in 0 45% NaCl 250 mL infusion (premix)  3-30 Units/kg/hr (Order-Specific) Intravenous Titrated    heparin (porcine) injection 4,800 Units  4,800 Units Intravenous Q1H PRN    heparin (porcine) injection 9,600 Units  9,600 Units Intravenous Q1H PRN    insulin lispro (HumaLOG) 100 units/mL subcutaneous injection 1-6 Units  1-6 Units Subcutaneous 4x Daily (AC & HS)    levothyroxine tablet 125 mcg  125 mcg Oral Early Morning    lisinopril (ZESTRIL) tablet 10 mg  10 mg Oral Daily    ondansetron (ZOFRAN) injection 4 mg  4 mg Intravenous Q4H PRN    oxyCODONE (ROXICODONE) IR tablet 5 mg  5 mg Oral Q4H PRN     Medications Prior to Admission   Medication    [] azithromycin (ZITHROMAX) 250 mg tablet    celecoxib (CeleBREX) 200 mg capsule    Cholecalciferol 1 25 MG (30869 UT) capsule    cholestyramine sugar free (QUESTRAN LIGHT) 4 g packet    Dapagliflozin Propanediol 5 MG TABS    levothyroxine (Levoxyl) 125 mcg tablet    lisinopril (ZESTRIL) 10 mg tablet     Allergies   Allergen Reactions    Codeine GI Intolerance    Liraglutide Diarrhea     MADE MY HEAD "FUZZY"  COULD NOT FOCUS    Pollen Extract Allergic Rhinitis    Statins Myalgia       Vitals: Blood pressure 155/85, pulse 74, temperature 98 2 °F (36 8 °C), resp  rate 14, height 5' 5" (1 651 m), weight 123 kg (271 lb), SpO2 96 % , room air, Body mass index is 45 1 kg/m²        Intake/Output Summary (Last 24 hours) at 2022 1514  Last data filed at 7/10/2022 2015  Gross per 24 hour   Intake 382 92 ml   Output --   Net 382 92 ml       Physical exam:    General Appearance:    Alert, cooperative, no conversational dyspnea or accessory     muscle use       Head/eyes:    Normocephalic, without obvious abnormality, atraumatic,         PERRL, sclera non-injected, nonicteric    Nose:   Nares normal, mucosa normal, no drainage or sinus tenderness   Mouth:   Moist mucous membranes, no thrush, normal dentition   Neck:   Supple, trachea midline, no adenopathy; JVD not elevated   Lungs:     Good aeration bilateral with few right basilar crackles, otherwise lungs are clear   Chest Wall:    No tenderness or deformity    Heart:    Regular rate and rhythm, S1 and S2 normal, no murmur, rub   or gallop   Abdomen:     Soft, non-tender, bowel sounds active all four quadrants,     no masses, no organomegaly   Extremities:   +1 bilateral lower extremity pitting edema, Extremities normal, atraumatic, no cyanosis    Skin:   Warm, dry, turgor normal, no rashes or lesions   Lymph nodes:   No Cervical or supraclavicular lymphadenopathy   Neurologic:   CNII-XII grossly intact, normal strength, non-focal         Labs:   CBC:   Lab Results   Component Value Date    WBC 8 31 07/11/2022    HGB 11 1 (L) 07/11/2022    HCT 34 3 (L) 07/11/2022    MCV 89 07/11/2022     07/11/2022    MCH 28 7 07/11/2022    MCHC 32 4 07/11/2022    RDW 13 3 07/11/2022    MPV 7 9 (L) 07/11/2022   , CMP:   Lab Results   Component Value Date    SODIUM 136 07/11/2022    K 3 7 07/11/2022     07/11/2022    CO2 24 07/11/2022    BUN 8 07/11/2022    CREATININE 0 78 07/11/2022    CALCIUM 8 8 07/11/2022    AST 16 07/11/2022    ALT 30 07/11/2022    ALKPHOS 111 07/11/2022    EGFR 80 07/11/2022       Imaging and other studies: I have personally reviewed pertinent films in PACS  CT chest 07/11/2022:  Right lower lobe pulmonary embolism with associated pulmonary infarct and pleural effusion  Mild lymphadenopathy  Few punctate pulmonary nodules 3 mm and smaller  Pulmonary function testing:  None    Echocardiogram 07/11/2022: Interpretation Summary         Left Ventricle: Normal left ventricular wall thickness, cavity size, and systolic function  Estimated ejection fraction 60%  No regional wall motion abnormality identified  Normal end-diastolic pressures estimated with unremarkable diastolic relaxation pattern    Right Ventricle: Normal right ventricular size and function    Aorta: The aortic root is mildly dilated    Trace mitral regurgitation    Normal estimated pulmonary pressures      Compared to study performed February 4324, LV systolic function remains normal   Right ventricular size and function is normal   No significant valvular abnormality was described  Katie Quezada DO      Portions of the record may have been created with voice recognition software  Occasional wrong word or "sound a like" substitutions may have occurred due to the inherent limitations of voice recognition software  Read the chart carefully and recognize, using context, where substitutions have occurred

## 2022-07-11 NOTE — ASSESSMENT & PLAN NOTE
Lab Results   Component Value Date    HGBA1C 7 1 (H) 08/17/2020       Recent Labs     07/10/22  2053 07/11/22  0726 07/11/22  0728 07/11/22  1057   POCGLU 134 160* 150* 123       · Intolerant to metformin due to GI side effects  · (P) 144Continue farxiga and add sliding scale

## 2022-07-11 NOTE — MALNUTRITION/BMI
This medical record reflects one or more clinical indicators suggestive of morbid obesity  Malnutrition Findings:                                 BMI Findings:  Adult BMI Classifications: Morbid Obesity 45-49 9        Body mass index is 45 1 kg/m²  See Nutrition note dated 7/11/22 for additional details  Completed nutrition assessment is viewable in the nutrition documentation

## 2022-07-11 NOTE — DISCHARGE SUMMARY
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  · Started on heparin infusion-continue heparin in total 36 hours, then transition to p o  Eliquis  · Eliquis breast check, cost only 10  · Echocardiogram normal  · Pulmonary consult appreciated, recommends 6 months of anticoagulation  And cleared the patient to be discharged home  Patient be discharged home with p o  Eliquis  And Pulmonary office will contact with patient for appointment  · Risk, benefits, side-effects and alternatives discussed in details with patient  Verbalizes to understand and agrees  Abnormal CT scan  Assessment & Plan  · Few punctate pulmonary nodules, 3 mm and smaller with unknown long-term stability  Based on current Fleischner Society 2017 Guidelines on incidental pulmonary nodule, no routine follow-up is needed if the patient is low risk  If the patient is high risk, optional follow-up chest CT at 12 months can be considered  · Minimally enlarged subcarinal lymph node  This could be followed up with chest CT in 3-6 months    · Pulmonary will follow up with the patient in 3-6 months    Primary hypertension  Assessment & Plan  · Continue lisinopril 10 mg daily    Diabetes mellitus [facility])  Assessment & Plan  Lab Results   Component Value Date    HGBA1C 7 1 (H) 08/17/2020       Recent Labs     07/10/22  2053 07/11/22  0726 07/11/22  0728 07/11/22  1057   POCGLU 134 160* 150* 123       · Intolerant to metformin due to GI side effects  · (P) 144Continue farxiga and add sliding scale    Hyperlipidemia  Assessment & Plan  · Intolerant to statins  Continue cholestyramine    Hypothyroidism  Assessment & Plan  · Continue levothyroxine  Patient uses brand name  Medical Problems             Resolved Problems  Date Reviewed: 7/10/2022   None               Discharging Physician / Practitioner: Leann Butt MD  PCP: Jessica Stout  Admission Date:   Admission Orders (From admission, onward)     Ordered        07/10/22 1302  Inpatient Admission  Once                      Discharge Date: 07/11/22    Consultations During Hospital Stay:  · Pulmonary    Procedures Performed:   PE Study with CT Abdomen and Pelvis with contrast    Result Date: 7/10/2022  Narrative: CT PULMONARY ANGIOGRAM OF THE CHEST AND CT ABDOMEN AND PELVIS WITH INTRAVENOUS CONTRAST INDICATION:   Pleuritic right mid back pain  COMPARISON:  CT abdomen and pelvis 7/6/2022 TECHNIQUE:  CT examination of the chest, abdomen and pelvis was performed  Thin section CT angiographic technique was used in the chest in order to evaluate for pulmonary embolus and coronal 3D MIP postprocessing was performed on the acquisition scanner  Axial, sagittal, and coronal 2D reformatted images were created from the source data and submitted for interpretation  Radiation dose length product (DLP) for this visit:  1896 mGy-cm   This examination, like all CT scans performed in the [facility], was performed utilizing techniques to minimize radiation dose exposure, including the use of iterative reconstruction and automated exposure control  IV Contrast:  60 mL of iohexol (OMNIPAQUE)  350 Enteric Contrast:  Enteric contrast was not administered  FINDINGS: CHEST PULMONARY ARTERIAL TREE:  Right lower lobe lobar, segmental, and subsegmental pulmonary emboli   No central pulmonary embolus  LUNGS:  Progressive moderate right lower lobe wedge-shaped groundglass airspace opacity attributed to evolving pulmonary infarct  Minimal bilateral lower lobe and inferior lingular subsegmental atelectasis  Hypoventilatory changes in the upper lobes  Multiple punctate pulmonary nodules in the upper lobes, the largest of which measures 3 mm on the right marked on image 29 series 4  1 to 2 mm nodules in the right middle lobe adjacent to the fissures likely intrapulmonary lymph nodes  Central airways are clear  PLEURA:  New small right pleural effusion  HEART/AORTA:  Heart is borderline enlarged  No pericardial effusion  Minimal aortic calcification  RV/LV ratio 1 04  No thoracic aortic aneurysm  MEDIASTINUM AND AUDREY:  Mildly enlarged subcarinal lymph node measuring 1 1 cm short axis  Tiny sliding hiatal hernia  CHEST WALL AND LOWER NECK:  Unremarkable  ABDOMEN LIVER/BILIARY TREE:  One or more subcentimeter sharply circumscribed low-density hepatic lesion(s) are noted, too small to accurately characterize, but statistically most likely to represent subcentimeter hepatic cysts  No suspicious solid hepatic lesion is identified  Prominent left lobe extends adjacent to the left upper quadrant  Hepatic contours are normal   No biliary dilatation  GALLBLADDER:  Post cholecystectomy  SPLEEN:  Unremarkable  PANCREAS:  Unremarkable  ADRENAL GLANDS:  Unremarkable  KIDNEYS/URETERS:  Unremarkable  No hydronephrosis  STOMACH AND BOWEL:  Colonic diverticulosis without diverticulitis  No bowel obstruction  APPENDIX:  Post appendectomy  ABDOMINOPELVIC CAVITY:  No ascites  No pneumoperitoneum  No lymphadenopathy  VESSELS:  Aortoiliac calcification  No aneurysm  PELVIS REPRODUCTIVE ORGANS:  Unremarkable for patient's age  URINARY BLADDER:  Tiny gas droplet in the bladder presumably due to recent catheterization  Otherwise unremarkable   ABDOMINAL WALL/INGUINAL REGIONS:  Stable small fat-containing umbilical hernia  OSSEOUS STRUCTURES:  No acute fracture or osseous destructive lesion identified  Degenerative changes of the spine, pubic symphysis, and multiple joints  Mild levoconvex lumbar scoliosis  Minimal grade 1 degenerative anterolisthesis of L5 on S1  Impression: CTA chest: Right lower lobe lobar, segmental and subsegmental pulmonary emboli  No central pulmonary embolus  The calculated ratio of right ventricular to left ventricular diameter (RV/LV ratio) is 1 04  This is greater than 0 9, which is abnormal and indicates right heart strain  An abnormal RV/LV ratio has been shown to be associated with an increased risk of 30 day mortality in the setting of acute pulmonary embolism  Urgent consultation with the medical critical care team is recommended  Moderate-sized evolving right lower lobe pulmonary infarct and small right pleural effusion  Few punctate pulmonary nodules, 3 mm and smaller with unknown long-term stability  Based on current Fleischner Society 2017 Guidelines on incidental pulmonary nodule, no routine follow-up is needed if the patient is low risk  If the patient is high risk, optional follow-up chest CT at 12 months can be considered  Minimally enlarged subcarinal lymph node  This could be followed up with chest CT in 3-6 months  CT abdomen and pelvis: No evidence of acute abdominopelvic process  Colonic diverticulosis  Pertinent findings on this study conveyed by myself to [address] [place] [address] on 7/10/2022 at 12:21 via [address] [place] [address] with prompt response  Workstation performed: [id number]     CT renal stone study abdomen pelvis wo contrast    Result Date: 7/6/2022  Narrative: CT ABDOMEN AND PELVIS WITHOUT IV CONTRAST - LOW DOSE RENAL STONE INDICATION:    Right flank pain COMPARISON:  None   TECHNIQUE:  Low radiation dose thin section CT examination of the abdomen and pelvis was performed without intravenous or oral contrast according to a protocol specifically designed to evaluate for urinary tract calculus  Axial, sagittal, and coronal 2D  reformatted images were created from the source data and submitted for interpretation  Evaluation for pathology in the abdomen and pelvis that is unrelated to urinary tract calculi is limited  Radiation dose length product (DLP) for this visit:  895 mGy-cm   This examination, like all CT scans performed in the [facility], was performed utilizing techniques to minimize radiation dose exposure, including the use of iterative reconstruction and automated exposure control  FINDINGS LOWER CHEST:  Asymmetric dependent opacities in the right base LIVER/BILIARY TREE:  Unremarkable  GALLBLADDER:  No calcified gallstones  No pericholecystic inflammatory change  SPLEEN:  Unremarkable  PANCREAS:  Unremarkable  ADRENAL GLANDS:  Unremarkable  KIDNEYS/URETERS:  Unremarkable  No hydronephrosis  STOMACH AND BOWEL:  There is colonic diverticulosis without evidence of acute diverticulitis  APPENDIX:  No findings to suggest appendicitis  ABDOMINOPELVIC CAVITY:  No ascites or free intraperitoneal air  No lymphadenopathy  VESSELS:  Unremarkable for patient's age  PELVIS REPRODUCTIVE ORGANS:  Unremarkable for patient's age  URINARY BLADDER:  Unremarkable  ABDOMINAL WALL/INGUINAL REGIONS:  Unremarkable  OSSEOUS STRUCTURES:  No acute fracture or destructive osseous lesion  Impression: 1  No renal system stone 2  Asymmetric dependent opacities in the right base, may represent pneumonia Workstation performed: [id number]     Echo complete w/ contrast if indicated    Result Date: 7/11/2022  · Narrative:   Left Ventricle: Normal left ventricular wall thickness, cavity size, and systolic function  Estimated ejection fraction 60%  No regional wall motion abnormality identified  Normal end-diastolic pressures estimated with unremarkable diastolic relaxation pattern    Right Ventricle: Normal right ventricular size and function    Aorta:  The aortic root is mildly dilated    Trace mitral regurgitation    Normal estimated pulmonary pressures  Compared to study performed February 6761, LV systolic function remains normal   Right ventricular size and function is normal   No significant valvular abnormality was described  ·     Significant Findings / Test Results:   Lab Results   Component Value Date    WBC 8 31 07/11/2022    HGB 11 1 (L) 07/11/2022    HCT 34 3 (L) 07/11/2022    MCV 89 07/11/2022     07/11/2022   ·   Lab Results   Component Value Date    SODIUM 136 07/11/2022    K 3 7 07/11/2022     07/11/2022    CO2 24 07/11/2022    AGAP 10 07/11/2022    BUN 8 07/11/2022    CREATININE 0 78 07/11/2022    GLUC 140 07/11/2022    CALCIUM 8 8 07/11/2022    AST 16 07/11/2022    ALT 30 07/11/2022    ALKPHOS 111 07/11/2022    TP 7 2 07/11/2022    TBILI 0 28 07/11/2022    EGFR 80 07/11/2022   ·   ·     Incidental Findings:   · As mentioned above     Test Results Pending at Discharge (will require follow up): · None     Outpatient Tests Requested:  · Patient will need repeat CT scan of chest in 3-6 months, patient will contact with PCP as well as pulmonologist office for the script    Complications:  None    Reason for Admission:  Flank pain    Hospital Course:   Jose Staples is a 57 y o  female patient who originally presented to the hospital on 7/10/2022 due to flank pain, secondary to acute pulmonary embolism with infarction  Patient placed on heparin drip  Most likely provoked, 1st episode  Patient received heparin drip almost 36 hours, transition to p o  Eliquis  Evaluated by pulmonologist, agrees with the treatment  Patient will need 6 months of anticoagulation treatment  Patient is saturating well on room air  Risk, benefits, side effects, alternative including bleeding risk discussed with the patient's  Patient verbalizes to understand and agrees      Patient also has abnormal CT scan for which patient will need to follow-up with PCP and pulmonologist in 3-6 months  Pulmonary office will try to reach patient to get appointment as soon as possible  Patient can resume all other home medication  All lab results, imaging finding, treatment options and plan discussed in details with patient  Patient verbalizes to understand and agrees  The patient, initially admitted to the hospital as inpatient, was discharged earlier than expected given the following: Patient condition significantly improved with the treatment  Pulmonary cleared the patient to be discharge       Please see above list of diagnoses and related plan for additional information  Condition at Discharge: stable    Discharge Day Visit / Exam:   Subjective:  Seen and evaluated during the round  Resting comfortably  Denies any significant complaint  Vitals: Blood Pressure: 155/85 (07/11/22 1436)  Pulse: 74 (07/11/22 1436)  Temperature: 98 2 °F (36 8 °C) (07/11/22 1436)  Temp Source: Temporal (07/10/22 7814)  Respirations: 14 (07/11/22 1436)  Height: 5' 5" (165 1 cm) (07/11/22 1112)  Weight - Scale: 123 kg (271 lb) (07/11/22 1112)  SpO2: 96 % (07/11/22 1436)  Exam:   Physical Exam  Vitals and nursing note reviewed  Exam conducted with a chaperone present  Constitutional:       Appearance: Normal appearance  She is obese  She is not ill-appearing or diaphoretic  HENT:      Head: Normocephalic and atraumatic  Nose: Nose normal  No congestion or rhinorrhea  Mouth/Throat:      Mouth: Mucous membranes are moist       Pharynx: Oropharynx is clear  No oropharyngeal exudate or posterior oropharyngeal erythema  Eyes:      General: No scleral icterus  Extraocular Movements: Extraocular movements intact  Conjunctiva/sclera: Conjunctivae normal       Pupils: Pupils are equal, round, and reactive to light  Cardiovascular:      Rate and Rhythm: Normal rate and regular rhythm  Heart sounds: No murmur heard  No friction rub  No gallop     Pulmonary: Effort: Pulmonary effort is normal  No respiratory distress  Breath sounds: No stridor  No rhonchi  Chest:      Chest wall: No tenderness  Abdominal:      General: Abdomen is flat  Bowel sounds are normal  There is no distension  Palpations: Abdomen is soft  There is no mass  Tenderness: There is no abdominal tenderness  Hernia: No hernia is present  Musculoskeletal:         General: No swelling, tenderness, deformity or signs of injury  Normal range of motion  Cervical back: Normal range of motion  No rigidity or tenderness  Lymphadenopathy:      Cervical: No cervical adenopathy  Skin:     General: Skin is warm  Capillary Refill: Capillary refill takes less than 2 seconds  Neurological:      General: No focal deficit present  Mental Status: She is alert and oriented to person, place, and time  Cranial Nerves: No cranial nerve deficit  Sensory: No sensory deficit  Motor: No weakness  Coordination: Coordination normal    Psychiatric:         Mood and Affect: Mood normal          Discussion with Family: Updated  (son) via phone  Discharge instructions/Information to patient and family:   See after visit summary for information provided to patient and family  Provisions for Follow-Up Care:  See after visit summary for information related to follow-up care and any pertinent home health orders  Disposition:   Home    Planned Readmission:  If condition get worse     Discharge Statement:  I spent >35 minutes discharging the patient  This time was spent on the day of discharge  I had direct contact with the patient on the day of discharge  Greater than 50% of the total time was spent examining patient, answering all patient questions, arranging and discussing plan of care with patient as well as directly providing post-discharge instructions  Additional time then spent on discharge activities      Discharge Medications:  See after visit summary for reconciled discharge medications provided to patient and/or family        **Please Note: This note may have been constructed using a voice recognition system**

## 2022-07-19 ENCOUNTER — OFFICE VISIT (OUTPATIENT)
Dept: PULMONOLOGY | Facility: CLINIC | Age: 62
End: 2022-07-19
Payer: COMMERCIAL

## 2022-07-19 VITALS
DIASTOLIC BLOOD PRESSURE: 80 MMHG | OXYGEN SATURATION: 97 % | SYSTOLIC BLOOD PRESSURE: 124 MMHG | HEART RATE: 103 BPM | HEIGHT: 65 IN | BODY MASS INDEX: 43.99 KG/M2 | WEIGHT: 264 LBS

## 2022-07-19 DIAGNOSIS — R93.89 ABNORMAL CT SCAN: ICD-10-CM

## 2022-07-19 DIAGNOSIS — I26.09 ACUTE PULMONARY EMBOLISM WITH ACUTE COR PULMONALE, UNSPECIFIED PULMONARY EMBOLISM TYPE (HCC): ICD-10-CM

## 2022-07-19 PROCEDURE — 99215 OFFICE O/P EST HI 40 MIN: CPT | Performed by: INTERNAL MEDICINE

## 2022-07-19 NOTE — PROGRESS NOTES
Pulmonary Follow Up Note   Brandon Vasquez 57 y o  female MRN: [id number]  7/19/2022    Assessment:  Acute pulmonary embolism  · Appears to be provoked by prolonged periods of immobilization, more than usual slightly recently  · Works from home, /preparing income tax  · Other risks: Morbid obesity  · Segmental/subsegmental at the RLL, with associated peripheral which she pulmonary infarct   · HD stable, low risk for bleeding treated with unfractionated heparin/discharged on apixaban no signs of RV strain on TTE during admission  · Reported a cousin had VTE in the age of 45s, no history of miscarriages    Plan:   · Discussed extensively about potential etiology  · Currently with persistent risk factors, still working from home/with prolonged periods of immobilization and no significant reduction/decreasing weight  · Based on that, would recommend lifelong therapy unless risk factors are changed/improved  · No signs or symptoms of bleeding or intolerance  · Reordered apixaban 5 mg q 12    Pulmonary nodules  · RLL wedge shape/peripheral GGOs, likely pulmonary infarct from associated PE   · Very small/punctate 2 and 3 mm pulmonary nodules  · Appears to be a low risk, lifelong nonsmoker no significant environmental/occupational hazards exposure  · Will check for changes/repeat CT chest scheduled in December      Return in about 5 months (around 12/19/2022)  History of Present Illness     Follow up for: HFU, PE/pulmonary nodules  Background:  57 y o  female with a h/o HTN, hypothyroidism, DM2, morbid obesity    Hospitalized 7/10 until 7/11 for acute pulmonary embolism  Reported periods of in activities/immobilization  Treated with unfractionated heparin/apixaban on discharge  Noted some pulmonary infarct/effusion in multiple/small pulmonary nodules  +FH, cousin had a blood clots and some sort of a "genetic disease"    Interval History  Since discharge, feeling better, no more fatigue or feeling tired  Reports feeling dyspnea on exertion over the past few months that also has improved  Noted heavy breathing at the end of exercise such as going up the steps that started around may  Reported easier breathing compared to the time since discharge  Still on apixaban 5 mg twice a day, no signs or symptoms of intolerance or bleeding  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  No prior history of lung disorders, asthma or COPD  Lifelong nonsmoker  Up-to-date on cancer screening with mammography, colonoscopy next scheduled this months [facility]  Review of Systems  As per hpi, all other systems reviewed and were negative    Studies:    Imaging and other studies: I have personally reviewed pertinent films in PACS  CT PE 07/10/2022-moderate RLL wedge shape GGO suspect evolving pulmonary infarct  Minimal bibasilar atelectasis 3 mm nodule on the right  RLL segmental/subsegmental pulmonary embolism  Borderline cardiomegaly  Mildly enlarged subcarinal lymph node, small sliding hiatal hernia  Pulmonary function testing:       EKG, Pathology, and Other Studies: I have personally reviewed pertinent reports  TTE 07/11/2022-EF 60%, normal wall motion  Normal RV size and function  Normal estimated pulmonary pressures  Past medical, surgical, social and family histories reviewed  Medications/Allergies: Reviewed      Vitals: Blood pressure 124/80, pulse 103, height 5' 5" (1 651 m), weight 120 kg (264 lb), SpO2 97 %  Body mass index is 43 93 kg/m²  Oxygen Therapy  SpO2: 97 %      Physical Exam  Body mass index is 43 93 kg/m²     Gen: not in acute distress,   Neck/Eyes: supple, no adenopathy, PERRL  Ear: normal appearance, no significant hearing impairment  Nose:  normal nasal mucosa, no drainage  Mouth:  unremarkable/normal appearance of lips, teeth and gums  Oropharynx: mucosa is moist, no focal lesions or erythema  Salivary glands: soft nontender  Chest: normal respiratory efforts, diminished but clear breath sounds bilaterally  CV: RRR, no murmurs appreciated, trace above ankle pitting edema  Abdomen: soft, non tender  Extremities:  No observed deformity   Skin: unremarkable  Neuro: AAO X3, no focal motor deficit        Labs:  Lab Results   Component Value Date    WBC 8 31 07/11/2022    HGB 11 1 (L) 07/11/2022    HCT 34 3 (L) 07/11/2022    MCV 89 07/11/2022     07/11/2022     Lab Results   Component Value Date    CALCIUM 8 8 07/11/2022    K 3 7 07/11/2022    CO2 24 07/11/2022     07/11/2022    BUN 8 07/11/2022    CREATININE 0 78 07/11/2022     No results found for: IGE  Lab Results   Component Value Date    ALT 30 07/11/2022    AST 16 07/11/2022    ALKPHOS 111 07/11/2022           Portions of the record may have been created with voice recognition software  Occasional wrong word or "sound a like" substitutions may have occurred due to the inherent limitations of voice recognition software  Read the chart carefully and recognize, using context, where substitutions have occurred    [facility]

## 2022-07-29 ENCOUNTER — APPOINTMENT (EMERGENCY)
Dept: RADIOLOGY | Facility: HOSPITAL | Age: 62
End: 2022-07-29
Payer: COMMERCIAL

## 2022-07-29 ENCOUNTER — HOSPITAL ENCOUNTER (EMERGENCY)
Facility: HOSPITAL | Age: 62
Discharge: HOME | End: 2022-07-29
Attending: EMERGENCY MEDICINE
Payer: COMMERCIAL

## 2022-07-29 VITALS
WEIGHT: 260 LBS | DIASTOLIC BLOOD PRESSURE: 63 MMHG | SYSTOLIC BLOOD PRESSURE: 120 MMHG | BODY MASS INDEX: 43.32 KG/M2 | OXYGEN SATURATION: 95 % | RESPIRATION RATE: 25 BRPM | TEMPERATURE: 97.7 F | HEIGHT: 65 IN | HEART RATE: 84 BPM

## 2022-07-29 DIAGNOSIS — R53.83 FATIGUE: ICD-10-CM

## 2022-07-29 DIAGNOSIS — R11.0 NAUSEA: Primary | ICD-10-CM

## 2022-07-29 LAB
ALBUMIN SERPL BCP-MCNC: 3.6 G/DL (ref 3.5–5)
ALP SERPL-CCNC: 96 U/L (ref 46–116)
ALT SERPL W P-5'-P-CCNC: 19 U/L (ref 12–78)
ANION GAP SERPL CALCULATED.3IONS-SCNC: 12 MMOL/L (ref 4–13)
AST SERPL W P-5'-P-CCNC: 12 U/L (ref 5–45)
BACTERIA UR QL AUTO: NORMAL /HPF
BASOPHILS # BLD AUTO: 0.04 THOUSANDS/ΜL (ref 0–0.1)
BASOPHILS NFR BLD AUTO: 0 % (ref 0–1)
BILIRUB SERPL-MCNC: 0.5 MG/DL (ref 0.2–1)
BILIRUB UR QL STRIP: NEGATIVE
BUN SERPL-MCNC: 8 MG/DL (ref 5–25)
CALCIUM SERPL-MCNC: 9.1 MG/DL (ref 8.3–10.1)
CARDIAC TROPONIN I PNL SERPL HS: <2 NG/L
CHLORIDE SERPL-SCNC: 102 MMOL/L (ref 96–108)
CLARITY UR: CLEAR
CO2 SERPL-SCNC: 22 MMOL/L (ref 21–32)
COLOR UR: YELLOW
CREAT SERPL-MCNC: 0.81 MG/DL (ref 0.6–1.3)
EOSINOPHIL # BLD AUTO: 0.11 THOUSAND/ΜL (ref 0–0.61)
EOSINOPHIL NFR BLD AUTO: 1 % (ref 0–6)
ERYTHROCYTE [DISTWIDTH] IN BLOOD BY AUTOMATED COUNT: 13.5 % (ref 11.6–15.1)
FLUAV RNA RESP QL NAA+PROBE: NEGATIVE
FLUBV RNA RESP QL NAA+PROBE: NEGATIVE
GFR SERPL CREATININE-BSD FRML MDRD: 76 ML/MIN/1.73SQ M
GLUCOSE SERPL-MCNC: 144 MG/DL (ref 65–140)
GLUCOSE UR STRIP-MCNC: NEGATIVE MG/DL
HCT VFR BLD AUTO: 41.2 % (ref 34.8–46.1)
HGB BLD-MCNC: 13.4 G/DL (ref 11.5–15.4)
HGB UR QL STRIP.AUTO: ABNORMAL
IMM GRANULOCYTES # BLD AUTO: 0.04 THOUSAND/UL (ref 0–0.2)
IMM GRANULOCYTES NFR BLD AUTO: 0 % (ref 0–2)
KETONES UR STRIP-MCNC: NEGATIVE MG/DL
LEUKOCYTE ESTERASE UR QL STRIP: NEGATIVE
LYMPHOCYTES # BLD AUTO: 1.7 THOUSANDS/ΜL (ref 0.6–4.47)
LYMPHOCYTES NFR BLD AUTO: 18 % (ref 14–44)
MAGNESIUM SERPL-MCNC: 1.9 MG/DL (ref 1.6–2.6)
MCH RBC QN AUTO: 28.3 PG (ref 26.8–34.3)
MCHC RBC AUTO-ENTMCNC: 32.5 G/DL (ref 31.4–37.4)
MCV RBC AUTO: 87 FL (ref 82–98)
MONOCYTES # BLD AUTO: 0.53 THOUSAND/ΜL (ref 0.17–1.22)
MONOCYTES NFR BLD AUTO: 6 % (ref 4–12)
NEUTROPHILS # BLD AUTO: 7.29 THOUSANDS/ΜL (ref 1.85–7.62)
NEUTS SEG NFR BLD AUTO: 75 % (ref 43–75)
NITRITE UR QL STRIP: NEGATIVE
NON-SQ EPI CELLS URNS QL MICRO: NORMAL /HPF
NRBC BLD AUTO-RTO: 0 /100 WBCS
PH UR STRIP.AUTO: 6.5 [PH]
PLATELET # BLD AUTO: 273 THOUSANDS/UL (ref 149–390)
PMV BLD AUTO: 8.2 FL (ref 8.9–12.7)
POTASSIUM SERPL-SCNC: 3.5 MMOL/L (ref 3.5–5.3)
PROT SERPL-MCNC: 8.1 G/DL (ref 6.4–8.4)
PROT UR STRIP-MCNC: NEGATIVE MG/DL
RBC # BLD AUTO: 4.74 MILLION/UL (ref 3.81–5.12)
RBC #/AREA URNS AUTO: NORMAL /HPF
RSV RNA RESP QL NAA+PROBE: NEGATIVE
SARS-COV-2 RNA RESP QL NAA+PROBE: NEGATIVE
SODIUM SERPL-SCNC: 136 MMOL/L (ref 135–147)
SP GR UR STRIP.AUTO: <=1.005 (ref 1–1.03)
UROBILINOGEN UR QL STRIP.AUTO: 0.2 E.U./DL
WBC # BLD AUTO: 9.71 THOUSAND/UL (ref 4.31–10.16)
WBC #/AREA URNS AUTO: NORMAL /HPF

## 2022-07-29 PROCEDURE — 84484 ASSAY OF TROPONIN QUANT: CPT | Performed by: PHYSICIAN ASSISTANT

## 2022-07-29 PROCEDURE — 71045 X-RAY EXAM CHEST 1 VIEW: CPT

## 2022-07-29 PROCEDURE — 96374 THER/PROPH/DIAG INJ IV PUSH: CPT

## 2022-07-29 PROCEDURE — 99284 EMERGENCY DEPT VISIT MOD MDM: CPT

## 2022-07-29 PROCEDURE — 83735 ASSAY OF MAGNESIUM: CPT | Performed by: PHYSICIAN ASSISTANT

## 2022-07-29 PROCEDURE — 99285 EMERGENCY DEPT VISIT HI MDM: CPT | Performed by: PHYSICIAN ASSISTANT

## 2022-07-29 PROCEDURE — 80053 COMPREHEN METABOLIC PANEL: CPT | Performed by: PHYSICIAN ASSISTANT

## 2022-07-29 PROCEDURE — 36415 COLL VENOUS BLD VENIPUNCTURE: CPT | Performed by: PHYSICIAN ASSISTANT

## 2022-07-29 PROCEDURE — 0241U HB NFCT DS VIR RESP RNA 4 TRGT: CPT | Performed by: PHYSICIAN ASSISTANT

## 2022-07-29 PROCEDURE — 93005 ELECTROCARDIOGRAM TRACING: CPT

## 2022-07-29 PROCEDURE — 96361 HYDRATE IV INFUSION ADD-ON: CPT

## 2022-07-29 PROCEDURE — 85025 COMPLETE CBC W/AUTO DIFF WBC: CPT | Performed by: PHYSICIAN ASSISTANT

## 2022-07-29 PROCEDURE — 81001 URINALYSIS AUTO W/SCOPE: CPT | Performed by: PHYSICIAN ASSISTANT

## 2022-07-29 RX ORDER — ONDANSETRON 2 MG/ML
4 INJECTION INTRAMUSCULAR; INTRAVENOUS ONCE
Status: COMPLETED | OUTPATIENT
Start: 2022-07-29 | End: 2022-07-29

## 2022-07-29 RX ORDER — PANTOPRAZOLE SODIUM 20 MG/1
20 TABLET, DELAYED RELEASE ORAL DAILY
Qty: 14 TABLET | Refills: 0 | Status: SHIPPED | OUTPATIENT
Start: 2022-07-29 | End: 2022-08-12

## 2022-07-29 RX ORDER — ONDANSETRON 4 MG/1
4 TABLET, ORALLY DISINTEGRATING ORAL EVERY 6 HOURS PRN
Qty: 20 TABLET | Refills: 0 | Status: SHIPPED | OUTPATIENT
Start: 2022-07-29

## 2022-07-29 RX ORDER — LISINOPRIL 10 MG/1
10 TABLET ORAL ONCE
Status: COMPLETED | OUTPATIENT
Start: 2022-07-29 | End: 2022-07-29

## 2022-07-29 RX ADMIN — SODIUM CHLORIDE 1000 ML: 0.9 INJECTION, SOLUTION INTRAVENOUS at 15:26

## 2022-07-29 RX ADMIN — LISINOPRIL 10 MG: 10 TABLET ORAL at 15:23

## 2022-07-29 RX ADMIN — ONDANSETRON 4 MG: 2 INJECTION INTRAMUSCULAR; INTRAVENOUS at 15:25

## 2022-07-29 NOTE — ED PROVIDER NOTES
History  Chief Complaint   Patient presents with    Fatigue     Pt reports R sided flank pain since lastnight, nausea and fatigue  Hx of PE 2-3 weeks ago  The patient is a 57year old female, past medical history of hypertension, diabetes, PE on Eliquis, who presents to emergency department today with a concern of feeling fatigued, decreased appetite and nausea over last 2 days  The patient states that she was recently admitted here from 7/10-7/11 for a acute pulmonary embolism, was discharged on Eliquis  Patient had preserved ejection fraction on echo  Patient states that she is fully vaccinated against COVID including booster vaccination  Patient was speaking to a nurse/ follow up call from her insurance carrier, and expressed that she feels very tired and nauseated and was referred back to the emergency department for evaluation  Patient did not take her lisinopril 10 mg today as previously prescribed  Patient has been taking her Eliquis as prescribed  Patient denies any chest pain, vomiting, abdominal pain, back pain, fevers chills, cough, hemoptysis  Fatigue  Severity:  Moderate  Onset quality:  Gradual  Duration:  2 days  Timing:  Constant  Progression:  Unchanged  Chronicity:  New  Relieved by:  None tried  Worsened by:  Nothing  Ineffective treatments:  None tried  Associated symptoms: nausea    Associated symptoms: no abdominal pain, no arthralgias, no chest pain, no cough, no dizziness, no dysuria, no fever, no seizures, no shortness of breath and no vomiting    Nausea:     Severity:  Mild    Timing:  Intermittent    Progression:  Unable to specify  Risk factors: diabetes and new medications        Prior to Admission Medications   Prescriptions Last Dose Informant Patient Reported? Taking?    Cholecalciferol 1 25 MG (83588 UT) capsule   Yes No   Sig: Takes on saturdays, but did not take it 7/9/22   Dapagliflozin Propanediol 5 MG TABS   Yes No   Sig: Take 5 mg by mouth daily   apixaban (Eliquis) 5 mg   No No   Sig: Take 1 tablet (5 mg total) by mouth 2 (two) times a day   cholestyramine sugar free (QUESTRAN LIGHT) 4 g packet   Yes No   Sig: Take 4 g by mouth in the morning   levothyroxine 125 mcg tablet  Self Yes No   Sig: Take 125 mcg by mouth daily   lisinopril (ZESTRIL) 10 mg tablet   Yes No   Sig: Take 10 mg by mouth daily      Facility-Administered Medications: None       Past Medical History:   Diagnosis Date    Diabetes mellitus ([address] )     GERD (gastroesophageal reflux disease)     Hyperlipidemia     Hypothyroidism     Osteoarthritis     Primary hypertension     Spondylosis        Past Surgical History:   Procedure Laterality Date    APPENDECTOMY       SECTION      CHOLECYSTECTOMY      HERNIA REPAIR      TOE SURGERY      TONSILLECTOMY      TUBAL LIGATION         Family History   Problem Relation Age of Onset    Pulmonary embolism Cousin      I have reviewed and agree with the history as documented  E-Cigarette/Vaping    E-Cigarette Use Never User      E-Cigarette/Vaping Substances     Social History     Tobacco Use    Smoking status: Never Smoker    Smokeless tobacco: Never Used   Vaping Use    Vaping Use: Never used   Substance Use Topics    Alcohol use: Never    Drug use: Never       Review of Systems   Constitutional: Positive for fatigue  Negative for chills and fever  HENT: Negative for ear pain and sore throat  Eyes: Negative for pain and visual disturbance  Respiratory: Negative for cough, shortness of breath and wheezing  Cardiovascular: Negative for chest pain, palpitations and leg swelling  Gastrointestinal: Positive for nausea  Negative for abdominal pain and vomiting  Genitourinary: Negative for dysuria and hematuria  Musculoskeletal: Negative for arthralgias and back pain  Skin: Negative for color change and rash  Neurological: Negative for dizziness, seizures and syncope     All other systems reviewed and are negative  Physical Exam  Physical Exam  Vitals and nursing note reviewed  Constitutional:       General: She is not in acute distress  Appearance: She is well-developed  HENT:      Head: Normocephalic and atraumatic  Mouth/Throat:      Mouth: Mucous membranes are moist    Eyes:      Extraocular Movements: Extraocular movements intact  Conjunctiva/sclera: Conjunctivae normal       Pupils: Pupils are equal, round, and reactive to light  Cardiovascular:      Rate and Rhythm: Regular rhythm  Tachycardia present  Pulses: Normal pulses  Heart sounds: No murmur heard  Pulmonary:      Effort: Pulmonary effort is normal  No respiratory distress  Breath sounds: Normal breath sounds  Abdominal:      Palpations: Abdomen is soft  Tenderness: There is no abdominal tenderness  There is no right CVA tenderness or left CVA tenderness  Musculoskeletal:      Cervical back: Normal range of motion and neck supple  Right lower leg: No edema  Left lower leg: No edema  Skin:     General: Skin is warm and dry  Neurological:      General: No focal deficit present  Mental Status: She is alert and oriented to person, place, and time  Motor: No weakness  Gait: Gait normal    Psychiatric:         Mood and Affect: Mood is anxious           Vital Signs  ED Triage Vitals   Temperature Pulse Respirations Blood Pressure SpO2   07/29/22 1454 07/29/22 1454 07/29/22 1454 07/29/22 1454 07/29/22 1455   97 7 °F (36 5 °C) (!) 108 20 (!) 195/111 98 %      Temp Source Heart Rate Source Patient Position - Orthostatic VS BP Location FiO2 (%)   07/29/22 1454 07/29/22 1454 07/29/22 1454 07/29/22 1454 --   Temporal Monitor Sitting Right arm       Pain Score       07/29/22 1454       No Pain           Vitals:    07/29/22 1600 07/29/22 1615 07/29/22 1630 07/29/22 1645   BP: 136/72 125/67 126/69 120/63   Pulse: 89 84 88 83   Patient Position - Orthostatic VS:             Visual Acuity      ED Medications  Medications   ondansetron (ZOFRAN) injection 4 mg (4 mg Intravenous Given 7/29/22 1525)   lisinopril (ZESTRIL) tablet 10 mg (10 mg Oral Given 7/29/22 1523)       Diagnostic Studies  Results Reviewed     Procedure Component Value Units Date/Time    Urine Microscopic [[id number]]  (Normal) Collected: 07/29/22 1519    Lab Status: Final result Specimen: Urine, Clean Catch Updated: 07/29/22 1650     RBC, UA 2-4 /hpf      WBC, UA None Seen /hpf      Epithelial Cells Occasional /hpf      Bacteria, UA Occasional /hpf     FLU/RSV/COVID - if FLU/RSV clinically relevant [[id number]]  (Normal) Collected: 07/29/22 1518    Lab Status: Final result Specimen: Nares from Nose Updated: 07/29/22 1616     SARS-CoV-2 Negative     INFLUENZA A PCR Negative     INFLUENZA B PCR Negative     RSV PCR Negative    Narrative:      FOR PEDIATRIC PATIENTS - copy/paste COVID Guidelines URL to browser: [email or web address]    SARS-CoV-2 assay is a Nucleic Acid Amplification assay intended for the  qualitative detection of nucleic acid from SARS-CoV-2 in nasopharyngeal  swabs  Results are for the presumptive identification of SARS-CoV-2 RNA  Positive results are indicative of infection with SARS-CoV-2, the virus  causing COVID-19, but do not rule out bacterial infection or co-infection  with other viruses  Laboratories within the [place] and its  territories are required to report all positive results to the appropriate  public health authorities  Negative results do not preclude SARS-CoV-2  infection and should not be used as the sole basis for treatment or other  patient management decisions  Negative results must be combined with  clinical observations, patient history, and epidemiological information  This test has not been FDA cleared or approved  This test has been authorized by FDA under an Emergency Use Authorization  (EUA)   This test is only authorized for the duration of time the  declaration that circumstances exist justifying the authorization of the  emergency use of an in vitro diagnostic tests for detection of SARS-CoV-2  virus and/or diagnosis of COVID-19 infection under section 564(b)(1) of  the Act, 21 U  S C  494QOX-2(H)(6), unless the authorization is terminated  or revoked sooner  The test has been validated but independent review by FDA  and CLIA is pending  Test performed using [employer] Gene[employer]pert: This RT-PCR assay targets N2,  a region unique to SARS-CoV-2  A conserved region in the E-gene was chosen  for pan-Sarbecovirus detection which includes SARS-CoV-2      UA w Reflex to Microscopic w Reflex to Culture [[id number]]  (Abnormal) Collected: 07/29/22 1519    Lab Status: Final result Specimen: Urine, Clean Catch Updated: 07/29/22 1615     Color, UA Yellow     Clarity, UA Clear     Specific Gravity, UA <=1 005     pH, UA 6 5     Leukocytes, UA Negative     Nitrite, UA Negative     Protein, UA Negative mg/dl      Glucose, UA Negative mg/dl      Ketones, UA Negative mg/dl      Urobilinogen, UA 0 2 E U /dl      Bilirubin, UA Negative     Occult Blood, UA Small    HS Troponin 0hr (reflex protocol) [[id number]]  (Normal) Collected: 07/29/22 1518    Lab Status: Final result Specimen: Blood from Arm, Right Updated: 07/29/22 1548     hs TnI 0hr <2 ng/L     Comprehensive metabolic panel [[id number]]  (Abnormal) Collected: 07/29/22 1518    Lab Status: Final result Specimen: Blood from Arm, Right Updated: 07/29/22 1541     Sodium 136 mmol/L      Potassium 3 5 mmol/L      Chloride 102 mmol/L      CO2 22 mmol/L      ANION GAP 12 mmol/L      BUN 8 mg/dL      Creatinine 0 81 mg/dL      Glucose 144 mg/dL      Calcium 9 1 mg/dL      AST 12 U/L      ALT 19 U/L      Alkaline Phosphatase 96 U/L      Total Protein 8 1 g/dL      Albumin 3 6 g/dL      Total Bilirubin 0 50 mg/dL      eGFR 76 ml/min/1 73sq m     Narrative:      [place] guidelines for Chronic Kidney Disease (CKD):     Stage 1 with normal or high GFR (GFR > 90 mL/min/1 73 square meters)    Stage 2 Mild CKD (GFR = 60-89 mL/min/1 73 square meters)    Stage 3A Moderate CKD (GFR = 45-59 mL/min/1 73 square meters)    Stage 3B Moderate CKD (GFR = 30-44 mL/min/1 73 square meters)    Stage 4 Severe CKD (GFR = 15-29 mL/min/1 73 square meters)    Stage 5 End Stage CKD (GFR <15 mL/min/1 73 square meters)  Note: GFR calculation is accurate only with a steady state creatinine    Magnesium [[id number]]  (Normal) Collected: 07/29/22 1518    Lab Status: Final result Specimen: Blood from Arm, Right Updated: 07/29/22 1541     Magnesium 1 9 mg/dL     CBC and differential [[id number]]  (Abnormal) Collected: 07/29/22 1518    Lab Status: Final result Specimen: Blood from Arm, Right Updated: 07/29/22 1525     WBC 9 71 Thousand/uL      RBC 4 74 Million/uL      Hemoglobin 13 4 g/dL      Hematocrit 41 2 %      MCV 87 fL      MCH 28 3 pg      MCHC 32 5 g/dL      RDW 13 5 %      MPV 8 2 fL      Platelets 736 Thousands/uL      nRBC 0 /100 WBCs      Neutrophils Relative 75 %      Immat GRANS % 0 %      Lymphocytes Relative 18 %      Monocytes Relative 6 %      Eosinophils Relative 1 %      Basophils Relative 0 %      Neutrophils Absolute 7 29 Thousands/µL      Immature Grans Absolute 0 04 Thousand/uL      Lymphocytes Absolute 1 70 Thousands/µL      Monocytes Absolute 0 53 Thousand/µL      Eosinophils Absolute 0 11 Thousand/µL      Basophils Absolute 0 04 Thousands/µL                  XR chest 1 view portable   Final Result by Khang Mittal MD (07/29 2558)      No active pulmonary disease on examination which is somewhat limited secondary to low lung volumes                    Workstation performed: [id number]                    Procedures  ECG 12 Lead Documentation Only    Date/Time: 7/29/2022 5:09 PM  Performed by: Tirso Solomon PA-C  Authorized by: Tirso Solomon PA-C     Indications / Diagnosis:  Nausea  ECG reviewed by me, the ED Provider: yes    Patient location:  ED  Previous ECG:     Previous ECG:  Unavailable  Interpretation:     Interpretation: non-specific    Rate:     ECG rate:  107    ECG rate assessment: tachycardic    Rhythm:     Rhythm: sinus tachycardia    Conduction:     Conduction: normal    T waves:     T waves: normal               ED Course                                             MDM  Number of Diagnoses or Management Options  Fatigue  Nausea  Diagnosis management comments: Patient's lab work unremarkable  Patient's urine sample illustrated trace blood  Educated to have follow-up with primary care physician if this were to continue or for re-evaluation  Patient did not have any discomfort  Upon arrival patient was anxious and hypertensive  Patient was given her hypertension medication does she missed today  Patient vital signs improved  Patient did not have any vomiting emergency department  Patient was started on new medications including Eliquis  Also was transition to a different medication for her diabetes  Patient was placed on Protonix and Zofran as needed instructed to follow-up with her primary care physician as an outpatient return if needed or if she has worsening symptoms    Patient expressed understanding was in agreement treatment plan       Amount and/or Complexity of Data Reviewed  Clinical lab tests: ordered and reviewed  Tests in the radiology section of CPT®: ordered and reviewed  Decide to obtain previous medical records or to obtain history from someone other than the patient: yes  Review and summarize past medical records: yes  Independent visualization of images, tracings, or specimens: yes    Risk of Complications, Morbidity, and/or Mortality  Presenting problems: moderate  Diagnostic procedures: moderate  Management options: moderate    Patient Progress  Patient progress: stable      Disposition  Final diagnoses:   Nausea   Fatigue     Time reflects when diagnosis was documented in both MDM as applicable and the Disposition within this note     Time User Action Codes Description Comment    7/29/2022  4:55 PM [address] [R11 0] Nausea     7/29/2022  4:55 PM Cesar Brantley Add [R53 83] Fatigue       ED Disposition     ED Disposition   Discharge    Condition   Stable    Date/Time   Fri Jul 29, 2022  4:55 PM    Comment   [facility] discharge to home/self care  Follow-up Information    None         Patient's Medications   Discharge Prescriptions    ONDANSETRON (ZOFRAN ODT) 4 MG DISINTEGRATING TABLET    Take 1 tablet (4 mg total) by mouth every 6 (six) hours as needed for nausea or vomiting       Start Date: 7/29/2022 End Date: --       Order Dose: 4 mg       Quantity: 20 tablet    Refills: 0    PANTOPRAZOLE (PROTONIX) 20 MG TABLET    Take 1 tablet (20 mg total) by mouth daily for 14 days       Start Date: 7/29/2022 End Date: 8/12/2022       Order Dose: 20 mg       Quantity: 14 tablet    Refills: 0       No discharge procedures on file      PDMP Review       Value Time User    PDMP Reviewed  Yes 7/11/2022  4:39 PM Sal Duval MD          ED Provider  Electronically Signed by           Diego Villar PA-C  07/29/22 3634

## 2022-08-01 LAB
ATRIAL RATE: 107 BPM
P AXIS: 28 DEGREES
PR INTERVAL: 174 MS
QRS AXIS: -18 DEGREES
QRSD INTERVAL: 76 MS
QT INTERVAL: 348 MS
QTC INTERVAL: 464 MS
T WAVE AXIS: 4 DEGREES
VENTRICULAR RATE: 107 BPM

## 2022-08-01 PROCEDURE — 93010 ELECTROCARDIOGRAM REPORT: CPT | Performed by: INTERNAL MEDICINE

## 2022-09-21 ENCOUNTER — OFFICE VISIT (OUTPATIENT)
Dept: UROLOGY | Facility: CLINIC | Age: 62
End: 2022-09-21

## 2022-09-21 VITALS
WEIGHT: 261 LBS | DIASTOLIC BLOOD PRESSURE: 80 MMHG | HEART RATE: 91 BPM | BODY MASS INDEX: 41.95 KG/M2 | HEIGHT: 66 IN | OXYGEN SATURATION: 98 % | SYSTOLIC BLOOD PRESSURE: 152 MMHG

## 2022-09-21 DIAGNOSIS — R31.9 HEMATURIA, UNSPECIFIED TYPE: Primary | ICD-10-CM

## 2022-09-21 LAB
SL AMB  POCT GLUCOSE, UA: ABNORMAL
SL AMB LEUKOCYTE ESTERASE,UA: ABNORMAL
SL AMB POCT BILIRUBIN,UA: ABNORMAL
SL AMB POCT BLOOD,UA: ABNORMAL
SL AMB POCT CLARITY,UA: CLEAR
SL AMB POCT COLOR,UA: YELLOW
SL AMB POCT KETONES,UA: ABNORMAL
SL AMB POCT NITRITE,UA: ABNORMAL
SL AMB POCT PH,UA: 6
SL AMB POCT SPECIFIC GRAVITY,UA: 1.01
SL AMB POCT URINE PROTEIN: ABNORMAL
SL AMB POCT UROBILINOGEN: 0.2

## 2022-09-21 NOTE — PROGRESS NOTES
9/21/2022    No chief complaint on file  Assessment and Plan    57 y o  female new patient to office    1  Microscopic hematuria  · Urine dip in office today positive for trace blood, negative for leukocytes or nitrates  · CT chest abdomen pelvis with contrast 07/10/2022  · Normal kidneys, ureters, and bladder  · Given persistent microscopic hematuria and prior history negative bladder dome biopsy, I recommend a cystoscopy to further evaluate  History of Present Illness  Arlie Lombard is a 57 y o  female here for evaluation of microscopic hematurai  She reports that she was seen at urgent care in July due to concerns for possible UTI as she had been reporting right flank pain  Her urine testing at [facility] urgent care was positive for blood and recommned patient be evaluated at the emergency department for possible kidney stone  CT imaging from 7/06/2022 was negative for urinary tract calculi or hydronephrosis  She was treated for a suspected Pneumonia  She then presented back to [address] [place] [address] on 7/10 for persistatne right lfank pain  CTA chest abdomen and pelvis identified a PE with infarct  She currently jayne any lower urinary tract symtpoms, fever, chills, gross hematurai, abdominal pain, or flank pain  She does experience nocturia 2 times per night denies issues with incontinence  Previously seen by [address] [facility] for history of gross hematuria, nephrolithiasis, stress urinary incontinence  She had a negative bladder dome biopsy 6/15/16 due to gross hematuria  Past Urologic surgery of what she reports as a bladder "tuck" surgery 12/2007 for urinary incontinence  She is unsure if this is considered a bladder sling  Urine dip in office today was positive for trace blood, negative for leukocytes or nitrates         Microscopic Urine:  7/29/2022- 2-4 RBC, No WBC, Occasional bacteria  7/10/2022- 1-2 RBC, 1-2 WBC, Occasional bacteria  7/06/2022- 2-4 RBC, 0-1 WBC, Occassional bacteria            Review of Systems Constitutional: Negative for chills and fever  HENT: Negative for ear pain and sore throat  Eyes: Negative for pain and visual disturbance  Respiratory: Negative for cough and shortness of breath  Cardiovascular: Negative for chest pain and palpitations  Gastrointestinal: Negative for abdominal pain, constipation, diarrhea, nausea and vomiting  Genitourinary: Positive for frequency and urgency  Negative for dysuria, flank pain and hematuria  Musculoskeletal: Negative for arthralgias and back pain  Skin: Negative for color change and rash  Neurological: Negative for dizziness, seizures and syncope  All other systems reviewed and are negative  Vitals  Vitals:    09/21/22 1349   BP: 152/80   BP Location: Left arm   Patient Position: Sitting   Cuff Size: Standard   Pulse: 91   SpO2: 98%   Weight: 118 kg (261 lb)   Height: 5' 6" (1 676 m)       Physical Exam  Vitals reviewed  Constitutional:       General: She is not in acute distress  Appearance: Normal appearance  She is normal weight  She is not ill-appearing or toxic-appearing  HENT:      Head: Normocephalic and atraumatic  Nose: Nose normal    Eyes:      General: No scleral icterus  Conjunctiva/sclera: Conjunctivae normal    Cardiovascular:      Rate and Rhythm: Normal rate  Pulses: Normal pulses  Pulmonary:      Effort: Pulmonary effort is normal  No respiratory distress  Abdominal:      General: Abdomen is flat  Palpations: Abdomen is soft  Tenderness: There is no abdominal tenderness  There is no right CVA tenderness or left CVA tenderness  Hernia: No hernia is present  Musculoskeletal:         General: Normal range of motion  Cervical back: Normal range of motion  Skin:     General: Skin is warm and dry  Neurological:      General: No focal deficit present  Mental Status: She is alert and oriented to person, place, and time  Mental status is at baseline     Psychiatric: Mood and Affect: Mood normal          Behavior: Behavior normal          Thought Content:  Thought content normal          Judgment: Judgment normal          Past History  Past Medical History:   Diagnosis Date    Diabetes mellitus ([address] )     GERD (gastroesophageal reflux disease)     Hyperlipidemia     Hypothyroidism     Osteoarthritis     Primary hypertension     Spondylosis      Social History     Socioeconomic History    Marital status: /Civil Union     Spouse name: None    Number of children: None    Years of education: None    Highest education level: None   Occupational History    None   Tobacco Use    Smoking status: Never Smoker    Smokeless tobacco: Never Used   Vaping Use    Vaping Use: Never used   Substance and Sexual Activity    Alcohol use: Never    Drug use: Never    Sexual activity: None   Other Topics Concern    None   Social History Narrative    None     Social Determinants of Health     Financial Resource Strain: Not on file   Food Insecurity: Not on file   Transportation Needs: Not on file   Physical Activity: Not on file   Stress: Not on file   Social Connections: Not on file   Intimate Partner Violence: Not on file   Housing Stability: Not on file     Social History     Tobacco Use   Smoking Status Never Smoker   Smokeless Tobacco Never Used     Family History   Problem Relation Age of Onset    Colon cancer Father     Prostate cancer Mother     Pulmonary embolism Cousin        The following portions of the patient's history were reviewed and updated as appropriate allergies, current medications, past medical history, past social history, past surgical history and problem list    [email or web address]  CT ABDOMEN AND PELVIS WITHOUT IV CONTRAST - LOW DOSE RENAL STONE      INDICATION:    Right flank pain     COMPARISON:  None      TECHNIQUE:  Low radiation dose thin section CT examination of the abdomen and pelvis was performed without intravenous or oral contrast according to a protocol specifically designed to evaluate for urinary tract calculus  Axial, sagittal, and coronal 2D   reformatted images were created from the source data and submitted for interpretation  Evaluation for pathology in the abdomen and pelvis that is unrelated to urinary tract calculi is limited        Radiation dose length product (DLP) for this visit:  895 mGy-cm   This examination, like all CT scans performed in the [facility], was performed utilizing techniques to minimize radiation dose exposure, including the use of iterative   reconstruction and automated exposure control      FINDINGS     LOWER CHEST:  Asymmetric dependent opacities in the right base     LIVER/BILIARY TREE:  Unremarkable      GALLBLADDER:  No calcified gallstones  No pericholecystic inflammatory change      SPLEEN:  Unremarkable      PANCREAS:  Unremarkable      ADRENAL GLANDS:  Unremarkable      KIDNEYS/URETERS:  Unremarkable  No hydronephrosis      STOMACH AND BOWEL:  There is colonic diverticulosis without evidence of acute diverticulitis      APPENDIX:  No findings to suggest appendicitis      ABDOMINOPELVIC CAVITY:  No ascites or free intraperitoneal air  No lymphadenopathy      VESSELS:  Unremarkable for patient's age      PELVIS     REPRODUCTIVE ORGANS:  Unremarkable for patient's age      URINARY BLADDER:  Unremarkable      ABDOMINAL WALL/INGUINAL REGIONS:  Unremarkable      OSSEOUS STRUCTURES:  No acute fracture or destructive osseous lesion      IMPRESSION:     1  No renal system stone  2  Asymmetric dependent opacities in the right base, may represent pneumonia          Results  No results found for this or any previous visit (from the past 1 hour(s))  ]  No results found for: PSA  Lab Results   Component Value Date    CALCIUM 9 1 07/29/2022    K 3 5 07/29/2022    CO2 22 07/29/2022     07/29/2022    BUN 8 07/29/2022    CREATININE 0 81 07/29/2022     Lab Results   Component Value Date    WBC 9 71 07/29/2022    HGB 13 4 07/29/2022    HCT 41 2 07/29/2022    MCV 87 07/29/2022     07/29/2022       Please Note:  Voice dictation software has been used to create this document  There may be inadvertent transcriptions errors       Iola Cockayne

## 2022-10-31 ENCOUNTER — TELEPHONE (OUTPATIENT)
Dept: UROLOGY | Facility: AMBULATORY SURGERY CENTER | Age: 62
End: 2022-10-31

## 2022-10-31 NOTE — TELEPHONE ENCOUNTER
Called and spoke with patient to schedule cystoscopy for microscopic hematuria  Patient requested [facility] office  Patient scheduled 11/21/22 @ 2:30 pm with Dr Nasra Sims  She was provided with [facility] office address and advised to arrive 15 minutes early

## 2022-10-31 NOTE — TELEPHONE ENCOUNTER
Pt is under the care of Tiffanie     Pt was last seen 09/21/22    Pt states that she was to have a cysto done and someone was to call her to set this one  She has not received a call       Please review and advice     Pt can be reached at [phone]

## 2022-11-21 ENCOUNTER — PROCEDURE VISIT (OUTPATIENT)
Dept: UROLOGY | Facility: MEDICAL CENTER | Age: 62
End: 2022-11-21

## 2022-11-21 VITALS
BODY MASS INDEX: 41.95 KG/M2 | HEIGHT: 66 IN | HEART RATE: 70 BPM | WEIGHT: 261 LBS | DIASTOLIC BLOOD PRESSURE: 82 MMHG | OXYGEN SATURATION: 97 % | SYSTOLIC BLOOD PRESSURE: 132 MMHG

## 2022-11-21 DIAGNOSIS — R31.29 OTHER MICROSCOPIC HEMATURIA: Primary | ICD-10-CM

## 2022-11-21 LAB
SL AMB  POCT GLUCOSE, UA: ABNORMAL
SL AMB LEUKOCYTE ESTERASE,UA: ABNORMAL
SL AMB POCT BILIRUBIN,UA: ABNORMAL
SL AMB POCT BLOOD,UA: ABNORMAL
SL AMB POCT CLARITY,UA: CLEAR
SL AMB POCT COLOR,UA: YELLOW
SL AMB POCT KETONES,UA: ABNORMAL
SL AMB POCT NITRITE,UA: ABNORMAL
SL AMB POCT PH,UA: 6
SL AMB POCT SPECIFIC GRAVITY,UA: 1.02
SL AMB POCT URINE PROTEIN: ABNORMAL
SL AMB POCT UROBILINOGEN: 0.2

## 2022-11-21 NOTE — LETTER
November 21, 2022     Liliana Ny MD  [address] [address]    Patient: Timo Bronson   YOB: 1960   Date of Visit: 11/21/2022       Dear Dr Sebastian Salinas:    Thank you for referring Timo Bronson to me for evaluation  Below are my notes for this consultation  If you have questions, please do not hesitate to call me  I look forward to following your patient along with you  Sincerely,        Babita Mejía MD        CC: No Recipients  Babita Mejía MD  11/21/2022  2:54 PM  Signed     Cystoscopy     Date/Time 11/21/2022 2:49 PM     Performed by  Babita Mejía MD     Authorized by Babita Mejía MD      Universal Protocol:  Consent: Verbal consent obtained  Written consent obtained  Risks and benefits: risks, benefits and alternatives were discussed  Consent given by: patient  Patient understanding: patient states understanding of the procedure being performed  Patient consent: the patient's understanding of the procedure matches consent given  Procedure consent: procedure consent matches procedure scheduled  Patient identity confirmed: verbally with patient        Procedure Details:    Patient tolerance: Patient tolerated the procedure well with no immediate complications    Additional Procedure Details: Cystoscopy Procedure Note        Pre-operative Diagnosis: Hematuria    Post-operative Diagnosis:  Chronic inflammation trigone        Procedure Details   The risks, benefits, complications, treatment options, and expected outcomes were discussed with the patient  The patient concurred with the proposed plan, giving informed consent  Cystoscopy was performed today under local anesthesia, using sterile technique  The patient was placed in the lithotomy  position, prepped and draped in the usual sterile fashion  A 15 Divehi flexible cystoscope  was used to inspect both the urethra and bladder  Findings: The bladder is unremarkable in appearance    Ureteral orifices are normal  Clear efflux is seen from the ureteral orifices  There is chronic inflammation on the trigone evidence of atrophic urethritis  No bladder lesion is seen  Specimens:  Small blood is noted on dipstick      Discussion:  Biopsy of the dome of the bladder in 2016 revealed chronic inflammation  Noncontrast CT scan done this year did not reveal any stone  A CT done with contrast later in the year revealed the kidneys to be normal   There is no hydronephrosis or lesion identified  Cystoscopy today reveals a generally normal appearing bladder with chronic inflammation on the trigone and atrophic changes in the urethra  These changes can be responsible for microscopic hematuria and appear benign in nature  She has has no irritative voiding symptoms at this time  She is satisfied with her voiding pattern  Options were discussed  We will continue to follow  She will return in 1 year

## 2022-11-21 NOTE — PROGRESS NOTES
Cystoscopy     Date/Time 11/21/2022 2:49 PM     Performed by  Obdulia Roman MD     Authorized by Obdulia Roman MD      Universal Protocol:  Consent: Verbal consent obtained  Written consent obtained  Risks and benefits: risks, benefits and alternatives were discussed  Consent given by: patient  Patient understanding: patient states understanding of the procedure being performed  Patient consent: the patient's understanding of the procedure matches consent given  Procedure consent: procedure consent matches procedure scheduled  Patient identity confirmed: verbally with patient        Procedure Details:    Patient tolerance: Patient tolerated the procedure well with no immediate complications    Additional Procedure Details: Cystoscopy Procedure Note        Pre-operative Diagnosis: Hematuria    Post-operative Diagnosis:  Chronic inflammation trigone        Procedure Details   The risks, benefits, complications, treatment options, and expected outcomes were discussed with the patient  The patient concurred with the proposed plan, giving informed consent  Cystoscopy was performed today under local anesthesia, using sterile technique  The patient was placed in the lithotomy  position, prepped and draped in the usual sterile fashion  A 15 Guamanian flexible cystoscope  was used to inspect both the urethra and bladder  Findings: The bladder is unremarkable in appearance  Ureteral orifices are normal   Clear efflux is seen from the ureteral orifices  There is chronic inflammation on the trigone evidence of atrophic urethritis  No bladder lesion is seen  Specimens:  Small blood is noted on dipstick      Discussion:  Biopsy of the dome of the bladder in 2016 revealed chronic inflammation  Noncontrast CT scan done this year did not reveal any stone  A CT done with contrast later in the year revealed the kidneys to be normal   There is no hydronephrosis or lesion identified    Cystoscopy today reveals a generally normal appearing bladder with chronic inflammation on the trigone and atrophic changes in the urethra  These changes can be responsible for microscopic hematuria and appear benign in nature  She has has no irritative voiding symptoms at this time  She is satisfied with her voiding pattern  Options were discussed  We will continue to follow  She will return in 1 year  We will check a urinalysis with microscopic prior to next year's visit

## 2022-11-25 ENCOUNTER — NURSE TRIAGE (OUTPATIENT)
Dept: OTHER | Facility: OTHER | Age: 62
End: 2022-11-25

## 2022-11-25 ENCOUNTER — APPOINTMENT (OUTPATIENT)
Dept: LAB | Facility: HOSPITAL | Age: 62
End: 2022-11-25

## 2022-11-25 DIAGNOSIS — R39.15 URGENCY OF URINATION: ICD-10-CM

## 2022-11-25 DIAGNOSIS — R39.15 URGENCY OF URINATION: Primary | ICD-10-CM

## 2022-11-25 LAB
BACTERIA UR QL AUTO: ABNORMAL /HPF
BILIRUB UR QL STRIP: NEGATIVE
CLARITY UR: ABNORMAL
COLOR UR: YELLOW
GLUCOSE UR STRIP-MCNC: NEGATIVE MG/DL
HGB UR QL STRIP.AUTO: ABNORMAL
KETONES UR STRIP-MCNC: NEGATIVE MG/DL
LEUKOCYTE ESTERASE UR QL STRIP: ABNORMAL
NITRITE UR QL STRIP: NEGATIVE
NON-SQ EPI CELLS URNS QL MICRO: ABNORMAL /HPF
PH UR STRIP.AUTO: 6 [PH]
PROT UR STRIP-MCNC: ABNORMAL MG/DL
RBC #/AREA URNS AUTO: ABNORMAL /HPF
SP GR UR STRIP.AUTO: 1.02 (ref 1–1.03)
UROBILINOGEN UR QL STRIP.AUTO: 0.2 E.U./DL
WBC #/AREA URNS AUTO: ABNORMAL /HPF

## 2022-11-25 NOTE — TELEPHONE ENCOUNTER
Patient reports pressure and urgency with urination that started on 11/23/22 s/p cystoscopy 11/21/22  She would like a call back to advise  Reason for Disposition  • Caller has NON-URGENT question and triager unable to answer question    Answer Assessment - Initial Assessment Questions  1  SYMPTOM: "What's the main symptom you're concerned about?" (e g , pain, fever, vomiting)      Pressure, urgency with urination   2  ONSET: "When did it  start?"      11/23/22  3  SURGERY: "What surgery was performed?"      Cystoscopy  4   DATE of SURGERY: "When was surgery performed?"       11/21/22    Protocols used: POST-OP SYMPTOMS AND QUESTIONS-ADULT-[place]

## 2022-11-25 NOTE — TELEPHONE ENCOUNTER
I spoke with pt and let her know a UC and UA will be ordered to rule out an infection  Advised she can call the on call provider tomorrow to see if UA is resulted yet if she goes today  Pt understood

## 2022-11-26 LAB — BACTERIA UR CULT: NORMAL

## 2022-11-28 RX ORDER — PHENAZOPYRIDINE HYDROCHLORIDE 200 MG/1
200 TABLET, FILM COATED ORAL 3 TIMES DAILY PRN
Qty: 10 TABLET | Refills: 0 | Status: SHIPPED | OUTPATIENT
Start: 2022-11-28

## 2022-11-28 NOTE — TELEPHONE ENCOUNTER
Results of urine culture from 11/25/22 was negative for infection  Prescription for pyridium sent to her pharmacy

## 2022-11-29 NOTE — TELEPHONE ENCOUNTER
LM for Gage De Leonn that her urine studies were negative- she should hydrate with water and Pyridium was sent to her pharmacy for her symtpoms

## 2022-12-05 ENCOUNTER — APPOINTMENT (OUTPATIENT)
Dept: LAB | Facility: HOSPITAL | Age: 62
End: 2022-12-05

## 2022-12-05 ENCOUNTER — HOSPITAL ENCOUNTER (OUTPATIENT)
Dept: CT IMAGING | Facility: HOSPITAL | Age: 62
Discharge: HOME | End: 2022-12-05
Attending: INTERNAL MEDICINE

## 2022-12-05 DIAGNOSIS — I26.99 ACUTE PULMONARY EMBOLISM, UNSPECIFIED PULMONARY EMBOLISM TYPE, UNSPECIFIED WHETHER ACUTE COR PULMONALE PRESENT (HCC): Primary | ICD-10-CM

## 2022-12-05 DIAGNOSIS — R93.89 ABNORMAL CT SCAN: ICD-10-CM

## 2022-12-05 DIAGNOSIS — I26.99 ACUTE PULMONARY EMBOLISM, UNSPECIFIED PULMONARY EMBOLISM TYPE, UNSPECIFIED WHETHER ACUTE COR PULMONALE PRESENT (HCC): ICD-10-CM

## 2022-12-05 LAB
ANION GAP SERPL CALCULATED.3IONS-SCNC: 9 MMOL/L (ref 4–13)
BUN SERPL-MCNC: 13 MG/DL (ref 5–25)
CALCIUM SERPL-MCNC: 9.3 MG/DL (ref 8.3–10.1)
CHLORIDE SERPL-SCNC: 101 MMOL/L (ref 96–108)
CO2 SERPL-SCNC: 27 MMOL/L (ref 21–32)
CREAT SERPL-MCNC: 0.8 MG/DL (ref 0.6–1.3)
GFR SERPL CREATININE-BSD FRML MDRD: 78 ML/MIN/1.73SQ M
GLUCOSE P FAST SERPL-MCNC: 143 MG/DL (ref 65–99)
POTASSIUM SERPL-SCNC: 4.4 MMOL/L (ref 3.5–5.3)
SODIUM SERPL-SCNC: 137 MMOL/L (ref 135–147)

## 2022-12-05 RX ADMIN — IOHEXOL 85 ML: 350 INJECTION, SOLUTION INTRAVENOUS at 10:28

## 2022-12-05 NOTE — PROGRESS NOTES
I was contacted by radiology department the patient is scheduled for CT with contrast   She did not have recent blood work to check kidney function  I placed BMP for full evaluation

## 2022-12-06 ENCOUNTER — OFFICE VISIT (OUTPATIENT)
Dept: PULMONOLOGY | Facility: CLINIC | Age: 62
End: 2022-12-06

## 2022-12-06 VITALS
HEART RATE: 100 BPM | TEMPERATURE: 96.9 F | WEIGHT: 260 LBS | HEIGHT: 66 IN | OXYGEN SATURATION: 98 % | BODY MASS INDEX: 41.78 KG/M2

## 2022-12-06 DIAGNOSIS — R93.89 ABNORMAL CT SCAN: ICD-10-CM

## 2022-12-06 DIAGNOSIS — I26.99 ACUTE PULMONARY EMBOLISM, UNSPECIFIED PULMONARY EMBOLISM TYPE, UNSPECIFIED WHETHER ACUTE COR PULMONALE PRESENT (HCC): Primary | ICD-10-CM

## 2022-12-06 RX ORDER — CELECOXIB 400 MG/1
400 CAPSULE ORAL 2 TIMES DAILY
COMMUNITY

## 2022-12-06 NOTE — PROGRESS NOTES
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  · Other risks morbid obesity  · During admission, CTPA showed segmental/subsegmental pulmonary embolism at the RLL with associated peripheral infarct  · No tPA, treated with unfractionated heparin and discharged on apixaban  · No signs of RV strain on TTE    Plan:  · Given the +FH, her first cousin had VTE at age of 36  · Will check thrombosis panel, advised to hold Eliquis 3 days, did the blood drawn on the fourth day and start taking the Eliquis immediately after the blood draw  · This will benefit to test her family members/children if she is positive  · Otherwise, given the persistent risk factors as above still working from home/with prolonged periods of immobilization and overweight/morbid obesity I recommended lifelong therapy unless risk factors are changed/modified  · No signs or symptoms of bleeding/or intolerance to the apixaban    Pulmonary nodules  · At the RLL with shape/peripheral GGO's  · Likely from pulmonary infarct associated with PE  · Few more nodules 2 and 3 mm  · Repeat CT chest last week, showed significant improvement of the RLL infiltrates, as well as the mediastinal LN  · Pt is not a high risk, no follow needed    Return in about 1 year (around 12/6/2023)  History of Present Illness     Follow up for: PE/pulmonary nodules      Background:  57 y o  female with a h/o HTN, hypothyroidism, DM2, morbid obesity     Hospitalized 7/10 until 7/11 for acute pulmonary embolism  Reported periods of in activities/immobilization  Treated with unfractionated heparin/apixaban on discharge  Noted some pulmonary infarct/effusion in multiple/small pulmonary nodules   +FH, cousin had a blood clots and some sort of a "genetic disease"    7/2022 visit-recommend to continue anticoagulation/lifelong therapy given the persistent risk factors, repeat CT chest to follow-up on the pulmonary nodules showed improvement of the right basal infiltrates, pending final read    Interval History  Since last seen, no major evidence of illness  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, doing office job/sitting still for long hours  Likely trying to stand up, every few hours to take a walk or move her legs  Continued on apixaban, no signs of intolerance or bleeding  L active and independent at baseline, had limited exercise capacity due to right hip pain  Review of Systems  As per hpi, all other systems reviewed and were negative    Studies:    Imaging and other studies: I have personally reviewed pertinent films in PACS  CT PE 07/10/2022-moderate RLL wedge shape GGO suspect evolving pulmonary infarct  Minimal bibasilar atelectasis 3 mm nodule on the right  RLL segmental/subsegmental pulmonary embolism  Borderline cardiomegaly  Mildly enlarged subcarinal lymph node, small sliding hiatal hernia      Pulmonary function testing:   None on file   EKG, Pathology, and Other Studies: I have personally reviewed pertinent reports  TTE 07/11/2022-EF 60%, normal wall motion  Normal RV size and function  Normal estimated pulmonary pressures        Past medical, surgical, social and family histories reviewed  Medications/Allergies: Reviewed      Vitals: Blood pressure (P) 136/78, pulse 100, temperature (!) 96 9 °F (36 1 °C), height 5' 6" (1 676 m), weight 118 kg (260 lb), SpO2 98 %  Body mass index is 41 97 kg/m²  Oxygen Therapy  SpO2: 98 %      Physical Exam  Body mass index is 41 97 kg/m²     Gen: not in acute distress, obese  Neck/Eyes: supple, no adenopathy, PERRL  Ear: normal appearance, no significant hearing impairment  Nose:  normal nasal mucosa, no drainage  Mouth:  unremarkable/normal appearance of lips, teeth and gums  Oropharynx: mucosa is moist, no focal lesions or erythema  Salivary glands: soft nontender  Chest: normal respiratory efforts, clear breath sounds bilaterally  CV: RRR, no murmurs appreciated, no edema  Abdomen: soft, non tender  Extremities:  No observed deformity   Skin: unremarkable  Neuro: AAO X3, no focal motor deficit        Labs:  Lab Results   Component Value Date    WBC 9 71 07/29/2022    HGB 13 4 07/29/2022    HCT 41 2 07/29/2022    MCV 87 07/29/2022     07/29/2022     Lab Results   Component Value Date    CALCIUM 9 3 12/05/2022    K 4 4 12/05/2022    CO2 27 12/05/2022     12/05/2022    BUN 13 12/05/2022    CREATININE 0 80 12/05/2022     No results found for: IGE  Lab Results   Component Value Date    ALT 19 07/29/2022    AST 12 07/29/2022    ALKPHOS 96 07/29/2022           Portions of the record may have been created with voice recognition software  Occasional wrong word or "sound a like" substitutions may have occurred due to the inherent limitations of voice recognition software  Read the chart carefully and recognize, using context, where substitutions have occurred    FLY Acevedo [facility]

## 2022-12-09 ENCOUNTER — TELEPHONE (OUTPATIENT)
Dept: PULMONOLOGY | Facility: CLINIC | Age: 62
End: 2022-12-09

## 2022-12-12 NOTE — TELEPHONE ENCOUNTER
Patient returning [facility] call  I advised her of instructions for Eliquis and labs  She stated she is on vacation right and does not want to be off the Eliquis while driving 10 hours  She will stop for 4 days once she is home and then will get blood work done

## 2022-12-14 NOTE — RESULT ENCOUNTER NOTE
CT shows near resolution of the pulmonary infiltrates/small nodules doesn't need further follow up    Pls notify the pt

## 2022-12-20 ENCOUNTER — APPOINTMENT (OUTPATIENT)
Dept: LAB | Facility: HOSPITAL | Age: 62
End: 2022-12-20

## 2022-12-20 DIAGNOSIS — I26.99 ACUTE PULMONARY EMBOLISM, UNSPECIFIED PULMONARY EMBOLISM TYPE, UNSPECIFIED WHETHER ACUTE COR PULMONALE PRESENT (HCC): ICD-10-CM

## 2022-12-21 DIAGNOSIS — I26.09 ACUTE PULMONARY EMBOLISM WITH ACUTE COR PULMONALE, UNSPECIFIED PULMONARY EMBOLISM TYPE (HCC): Primary | ICD-10-CM

## 2022-12-23 LAB
B2 GLYCOPROT1 IGA SERPL IA-ACNC: 0.7
B2 GLYCOPROT1 IGG SERPL IA-ACNC: 1.4
B2 GLYCOPROT1 IGM SERPL IA-ACNC: <2.4
CARDIOLIPIN IGA SER IA-ACNC: 1
CARDIOLIPIN IGG SER IA-ACNC: 1.5
CARDIOLIPIN IGM SER IA-ACNC: <0.9

## 2022-12-27 ENCOUNTER — APPOINTMENT (OUTPATIENT)
Dept: LAB | Facility: HOSPITAL | Age: 62
End: 2022-12-27

## 2022-12-27 DIAGNOSIS — I26.99 ACUTE PULMONARY EMBOLISM, UNSPECIFIED PULMONARY EMBOLISM TYPE, UNSPECIFIED WHETHER ACUTE COR PULMONALE PRESENT (HCC): ICD-10-CM

## 2022-12-27 DIAGNOSIS — I26.09 ACUTE PULMONARY EMBOLISM WITH ACUTE COR PULMONALE, UNSPECIFIED PULMONARY EMBOLISM TYPE (HCC): ICD-10-CM

## 2022-12-27 LAB
DEPRECATED AT III PPP: 97 % OF NORMAL (ref 92–136)
F2 GENE MUT ANL BLD/T: NORMAL
F5 GENE MUT ANL BLD/T: ABNORMAL

## 2022-12-29 LAB
APTT SCREEN TO CONFIRM RATIO: 1.18 RATIO (ref 0–1.34)
CONFIRM APTT/NORMAL: 37.6 SEC (ref 0–47.6)
LA PPP-IMP: NORMAL
PROT C AG ACT/NOR PPP IA: >150 % OF NORMAL (ref 60–150)
PROT S ACT/NOR PPP: 112 % (ref 68–108)
PROT S ACT/NOR PPP: 141 % (ref 61–136)
PROT S PPP-ACNC: 133 % (ref 60–150)
SCREEN APTT: 42.7 SEC (ref 0–51.9)
SCREEN DRVVT: 40.4 SEC (ref 0–47)
THROMBIN TIME: 19.3 SEC (ref 0–23)

## 2022-12-30 LAB
B2 GLYCOPROT1 IGA SERPL IA-ACNC: 0.6
B2 GLYCOPROT1 IGG SERPL IA-ACNC: 1.3
B2 GLYCOPROT1 IGM SERPL IA-ACNC: <2.4
CARDIOLIPIN IGA SER IA-ACNC: 1
CARDIOLIPIN IGG SER IA-ACNC: 1.3
CARDIOLIPIN IGM SER IA-ACNC: 2.3

## 2023-01-01 NOTE — ASSESSMENT & PLAN NOTE
· Few punctate pulmonary nodules, 3 mm and smaller with unknown long-term stability  Based on current Fleischner Society 2017 Guidelines on incidental pulmonary nodule, no routine follow-up is needed if the patient is low risk  If the patient is high risk, optional follow-up chest CT at 12 months can be considered  · Minimally enlarged subcarinal lymph node  This could be followed up with chest CT in 3-6 months    · Pulmonary will follow up with the patient in 3-6 months The patient is a 1m3w Female complaining of abdominal pain.

## 2023-01-03 ENCOUNTER — TELEPHONE (OUTPATIENT)
Dept: OTHER | Facility: HOSPITAL | Age: 63
End: 2023-01-03

## 2023-01-03 LAB
F2 GENE MUT ANL BLD/T: NORMAL
F5 GENE MUT ANL BLD/T: ABNORMAL

## 2023-01-03 NOTE — TELEPHONE ENCOUNTER
Called patient to review results of blood work  Patient has positive Factor V Leiden  Recommend she continue Eliquis 5 mg po BID indefinitely  Also recommended she inform her direct family members so they can be tested as well  She verbalized understanding and agrees to the plan

## 2023-01-11 NOTE — TELEPHONE ENCOUNTER
[facility] has called in requesting a call back to go over missing REFLEX lab work for the pt  They would like to confirm what else she needs done   Please advise     [phone] Option 1

## 2023-01-11 NOTE — TELEPHONE ENCOUNTER
I called the lab, they want to know if we still need the C & S antigen done? They said the pt would need to come back and another order placed as it was cancelled

## 2023-04-04 ENCOUNTER — NURSE TRIAGE (OUTPATIENT)
Dept: OTHER | Facility: OTHER | Age: 63
End: 2023-04-04

## 2023-04-04 ENCOUNTER — APPOINTMENT (OUTPATIENT)
Dept: LAB | Facility: HOSPITAL | Age: 63
End: 2023-04-04

## 2023-04-04 DIAGNOSIS — N30.00 ACUTE CYSTITIS WITHOUT HEMATURIA: Primary | ICD-10-CM

## 2023-04-04 DIAGNOSIS — R31.9 URINARY TRACT INFECTION WITH HEMATURIA, SITE UNSPECIFIED: Primary | ICD-10-CM

## 2023-04-04 DIAGNOSIS — N39.0 URINARY TRACT INFECTION WITH HEMATURIA, SITE UNSPECIFIED: Primary | ICD-10-CM

## 2023-04-04 LAB
BACTERIA UR QL AUTO: ABNORMAL /HPF
BILIRUB UR QL STRIP: NEGATIVE
CLARITY UR: ABNORMAL
COLOR UR: YELLOW
GLUCOSE UR STRIP-MCNC: NEGATIVE MG/DL
HGB UR QL STRIP.AUTO: ABNORMAL
KETONES UR STRIP-MCNC: NEGATIVE MG/DL
LEUKOCYTE ESTERASE UR QL STRIP: ABNORMAL
NITRITE UR QL STRIP: POSITIVE
NON-SQ EPI CELLS URNS QL MICRO: ABNORMAL /HPF
PH UR STRIP.AUTO: 6.5 [PH]
PROT UR STRIP-MCNC: ABNORMAL MG/DL
RBC #/AREA URNS AUTO: ABNORMAL /HPF
SP GR UR STRIP.AUTO: 1.01 (ref 1–1.03)
UROBILINOGEN UR QL STRIP.AUTO: 0.2 E.U./DL
WBC #/AREA URNS AUTO: ABNORMAL /HPF

## 2023-04-04 RX ORDER — SULFAMETHOXAZOLE AND TRIMETHOPRIM 800; 160 MG/1; MG/1
1 TABLET ORAL EVERY 12 HOURS SCHEDULED
Qty: 10 TABLET | Refills: 0 | Status: SHIPPED | OUTPATIENT
Start: 2023-04-04 | End: 2023-04-09

## 2023-04-04 NOTE — RESULT ENCOUNTER NOTE
Please let patient know her urine testing is positive for UTI  Urine culture is currently pending  I have sent a prescription for Bactrim to her pharmacy, we will contact her if this needs to be adjusted based on final culture and sensitivity results

## 2023-04-04 NOTE — TELEPHONE ENCOUNTER
"Regarding: possible UTI  ----- Message from Letty Diaz sent at 4/4/2023  9:02 AM EDT -----  \" I am having urgency, frequency and pressure with urination  I would like to go in for a urine test today  \"    "

## 2023-04-04 NOTE — TELEPHONE ENCOUNTER
"Patient called in to report symptoms of frequency, urgency, and pressure in lower abdomen toward the end of her stream since last Wednesday 3/29/23  Patient reports being advised by provider post cystoscopy 11/21/2022 ricky she would always have blood in her urine, so labs were ordered and indicate hematuria  Patient will go to [address] [place] [address] lab today for specimen  Please follow up with patient  Reason for Disposition  • Urinating more frequently than usual (i e , frequency)    Answer Assessment - Initial Assessment Questions  1  SYMPTOM: \"What's the main symptom you're concerned about? \" (e g , frequency, incontinence)      Urgency, frequency, and pressure toward end of stream  2  ONSET: \"When did the symptoms start? \"      3/29/23  3  PAIN: \"Is there any pain? \" If Yes, ask: \"How bad is it? \" (Scale: 1-10; mild, moderate, severe)      Denies  4  CAUSE: \"What do you think is causing the symptoms? \"      UTI  5  OTHER SYMPTOMS: \"Do you have any other symptoms? \" (e g , fever, flank pain, blood in urine, pain with urination)      Patient reports she will always have blood in her urine post cystoscopy   6  PREGNANCY: \"Is there any chance you are pregnant? \" \"When was your last menstrual period? \"      Post menopausal    Protocols used: URINARY SYMPTOMS-ADULT-[place]    "

## 2023-04-06 LAB — BACTERIA UR CULT: ABNORMAL

## 2023-04-28 DIAGNOSIS — I26.09 ACUTE PULMONARY EMBOLISM WITH ACUTE COR PULMONALE, UNSPECIFIED PULMONARY EMBOLISM TYPE (HCC): ICD-10-CM

## 2023-04-30 RX ORDER — APIXABAN 5 MG/1
TABLET, FILM COATED ORAL
Qty: 180 TABLET | Refills: 3 | Status: SHIPPED | OUTPATIENT
Start: 2023-04-30

## 2023-05-01 NOTE — TELEPHONE ENCOUNTER
Called and spoke with Sagrario Albarado, she does not use Celebrex  She was already informed to not use this with Eliquis 
Pls call the pt and ask about the celebrex, she needs to reduce the dose or stop   Since both Eliquis and celebrex therapy can increase risk of bleeding
allergic reaction

## 2023-10-10 LAB — HBA1C MFR BLD HPLC: 6.7 %

## 2023-10-23 NOTE — PROGRESS NOTES
Pulmonary Follow-Up Note   Willie Borden 70 y.o. female MRN: [id number]  10/24/2023      Assessment/Plan:    Pulmonary Embolism: reviewed prior labs with Sagle Holton today and she does have a Factor V Leiden positive result. She has notified her children and sibling. Continue lifelong anticoagulation with Eliquis - refill sent to pharmacy  We did review the heritability and genetic risks to her progeny and future generations  Morbid obesity: BMI 41.8  Ongoing risk of clot formation due to lack of mobility  She is taking Ozempic via PCP and has experienced some weight loss  She is adding mobility to her workday when possible. Diagnoses and all orders for this visit:    Acute pulmonary embolism, unspecified pulmonary embolism type, unspecified whether acute cor pulmonale present (HCC)    BMI 40.0-44.9, adult ([facility])    Acute pulmonary embolism with acute cor pulmonale, unspecified pulmonary embolism type (HCC)  -     apixaban (Eliquis) 5 mg; Take 1 tablet (5 mg total) by mouth 2 (two) times a day    Other orders  -     semaglutide, 0.25 or 0.5 mg/dose, (Ozempic, 0.25 or 0.5 MG/DOSE,) 2 mg/3 mL injection pen; Start: 08/28/23 12:50:00 EDT, 3 mL, INJECT 0.5 MG SUBCUTANEOUSLY WEEKLY, Supply        Return in about 1 year (around 10/24/2024). All of Shakira's questions were answered prior to leaving the office today. She will follow-up in 12 months or sooner should the need arise. She is aware to call our office with any further questions or concerns. History of Present Illness   Reason for Visit: Annual follow up  Chief Complaint: Hx pulmonary embolism  HPI: Willie Borden is a 70 y.o. female with PMH of HTN, hyperlipidemia, T2DM, and pulmonary embolus 7/2022 who presents to the office today for follow up. She returns today feeling well overall. She has not had any episodes of bleeding abnormality since the last visit. She has no particular pulmonary symptoms.     PCP has her on Ozempic and she has lost about 10 lb since last visit.    She has tried to add movement to her day - getting up from the desk more and walking around the home office. Background:  57 y.o. female with a h/o HTN, hypothyroidism, DM2, morbid obesity     Hospitalized 7/10 until  for acute pulmonary embolism. Reported periods of in activities/immobilization. Treated with unfractionated heparin/apixaban on discharge. Noted some pulmonary infarct/effusion in multiple/small pulmonary nodules. +FH, cousin had a blood clots and some sort of a "genetic disease"     2022 visit-recommend to continue anticoagulation/lifelong therapy given the persistent risk factors, repeat CT chest to follow-up on the pulmonary nodules showed improvement of the right basal infiltrates, pending final read    Review of Systems Please note that a 14-point review of systems was performed to include Constitutional, HEENT, Respiratory, CVS, GI, , Musculoskeletal, Integumentary, Neurologic, Rheumatologic, Endocrinologic, Psychiatric, Lymphatic, and Hematologic/Oncologic systems were reviewed and are negative unless otherwise stated in HPI. Positive and negative findings pertinent to this evaluation are incorporated into the history of present illness.        Historical Information   Past Medical History:   Diagnosis Date    Diabetes mellitus ([address])     GERD (gastroesophageal reflux disease)     Hyperlipidemia     Hypothyroidism     Osteoarthritis     Primary hypertension     Spondylosis      Past Surgical History:   Procedure Laterality Date    APPENDECTOMY       SECTION      CHOLECYSTECTOMY      HERNIA REPAIR      TOE SURGERY      TONSILLECTOMY      TUBAL LIGATION       Family History   Problem Relation Age of Onset    Colon cancer Father     Prostate cancer Mother     Pulmonary embolism Cousin      Social History   Social History     Substance and Sexual Activity   Alcohol Use Never     Social History     Substance and Sexual Activity   Drug Use Never     Social History Tobacco Use   Smoking Status Never   Smokeless Tobacco Never     E-Cigarette/Vaping    E-Cigarette Use Never User      E-Cigarette/Vaping Substances       Meds/Allergies     Current Outpatient Medications:     apixaban (Eliquis) 5 mg, Take 1 tablet (5 mg total) by mouth 2 (two) times a day, Disp: 180 tablet, Rfl: 3    Cholecalciferol 1.25 MG (71122 UT) capsule, Takes on saturdays, but did not take it 7/9/22, Disp: , Rfl:     levothyroxine 125 mcg tablet, Take 125 mcg by mouth daily, Disp: , Rfl:     semaglutide, 0.25 or 0.5 mg/dose, (Ozempic, 0.25 or 0.5 MG/DOSE,) 2 mg/3 mL injection pen, Start: 08/28/23 12:50:00 EDT, 3 mL, INJECT 0.5 MG SUBCUTANEOUSLY WEEKLY, Supply, Disp: , Rfl:     lisinopril (ZESTRIL) 10 mg tablet, Take 10 mg by mouth daily (Patient not taking: Reported on 9/21/2022), Disp: , Rfl:   Allergies   Allergen Reactions    Codeine GI Intolerance    Liraglutide Diarrhea     MADE MY HEAD "FUZZY"  COULD NOT FOCUS    Pollen Extract Allergic Rhinitis    Statins Myalgia       Vitals: Blood pressure 148/88, pulse 92, temperature (!) 97.2 °F (36.2 °C), height 5' 6" (1.676 m), weight 117 kg (259 lb), SpO2 97 %. Body mass index is 41.8 kg/m². Oxygen Therapy  SpO2: 97 %      Physical Exam  Constitutional:       Appearance: She is obese. HENT:      Nose: Nose normal.      Mouth/Throat:      Mouth: Mucous membranes are moist.      Pharynx: Oropharynx is clear. Eyes:      Pupils: Pupils are equal, round, and reactive to light. Cardiovascular:      Rate and Rhythm: Normal rate and regular rhythm. Pulses: Normal pulses. Heart sounds: Normal heart sounds. Pulmonary:      Effort: Pulmonary effort is normal.      Breath sounds: Normal breath sounds. Abdominal:      General: Abdomen is flat. Palpations: Abdomen is soft. Musculoskeletal:         General: Normal range of motion. Cervical back: Normal range of motion. Skin:     General: Skin is warm and dry.       Capillary Refill: Capillary refill takes less than 2 seconds. Neurological:      Mental Status: She is alert and oriented to person, place, and time. Psychiatric:         Mood and Affect: Mood normal.         Labs:   Component      Latest Ref Rng 12/27/2022   PTT LUPUS ANTICOAGULANT      0.0 - 51.9 sec 42.7    DILUTE JAKUB VIPER VENOM TIME      0.0 - 47.0 sec 40.4    DILUTE PROTHROMBIN TIME(DPT)      0.0 - 47.6 sec 37.6    THROMBIN TIME (DRVW)      0.0 - 23.0 sec 19.3    DPT CONFIRM RATIO      0.00 - 1.34 Ratio 1.18    LUPUS REFLEX INTERPRETATION Not detected    ANTICARDIOLIPIN IGG ANTIBODY      See comment  1.3    ANTICARDIOLIPIN IGA ANTIBODY      See comment  1.0    ANTICARDIOLIPIN IGM ANTIBODY      See comment  2.3    BETA-2 GLYCOPROTEIN 1 IGG ANTIBODY      See comment  1.3    BETA-2 GLYCOPROTEIN 1 IGA ANTIBODY      See comment  0.6    BETA-2 GLYCOPROTEIN 1 IGM ANTIBODY      See comment  <2.4    PROTEIN S ANTIGEN TOTAL      60 - 150 % 133    PROTEIN S ANTIGEN FREE      61 - 136 % 141 (H)    FACTOR V LEIDEN Detected, heterozygous    PROTHROMBIN [id number] MUTATION Not detected   ANTITHROMBIN III ACTIVITY      92 - 136 % of Normal 97    Protein C Activity      60 - 150 % of Normal >150 (H)    PROTEIN S ACTIVITY      68 - 108 % 112 (H)         Imaging and other studies: No new imaging since last visit      Snehal Sosa, [address] [facility]        Portions of the record may have been created with voice recognition software. Occasional wrong word or "sound a like" substitutions may have occurred due to the inherent limitations of voice recognition software. Read the chart carefully and recognize, using context, where substitutions have occurred or contact the dictating provider.

## 2023-10-24 ENCOUNTER — OFFICE VISIT (OUTPATIENT)
Dept: PULMONOLOGY | Facility: CLINIC | Age: 63
End: 2023-10-24

## 2023-10-24 VITALS
BODY MASS INDEX: 41.62 KG/M2 | SYSTOLIC BLOOD PRESSURE: 148 MMHG | HEART RATE: 92 BPM | TEMPERATURE: 97.2 F | OXYGEN SATURATION: 97 % | WEIGHT: 259 LBS | DIASTOLIC BLOOD PRESSURE: 88 MMHG | HEIGHT: 66 IN

## 2023-10-24 DIAGNOSIS — I26.09 ACUTE PULMONARY EMBOLISM WITH ACUTE COR PULMONALE, UNSPECIFIED PULMONARY EMBOLISM TYPE (HCC): ICD-10-CM

## 2023-10-24 DIAGNOSIS — I26.99 ACUTE PULMONARY EMBOLISM, UNSPECIFIED PULMONARY EMBOLISM TYPE, UNSPECIFIED WHETHER ACUTE COR PULMONALE PRESENT (HCC): Primary | ICD-10-CM

## 2023-11-14 ENCOUNTER — OFFICE VISIT (OUTPATIENT)
Dept: UROLOGY | Facility: CLINIC | Age: 63
End: 2023-11-14
Payer: MEDICARE

## 2023-11-14 VITALS
TEMPERATURE: 97.3 F | SYSTOLIC BLOOD PRESSURE: 150 MMHG | HEART RATE: 85 BPM | DIASTOLIC BLOOD PRESSURE: 98 MMHG | OXYGEN SATURATION: 100 % | BODY MASS INDEX: 40.87 KG/M2 | WEIGHT: 253.2 LBS

## 2023-11-14 DIAGNOSIS — R31.29 OTHER MICROSCOPIC HEMATURIA: Primary | ICD-10-CM

## 2023-11-14 LAB
SL AMB  POCT GLUCOSE, UA: NORMAL
SL AMB LEUKOCYTE ESTERASE,UA: NORMAL
SL AMB POCT BILIRUBIN,UA: NORMAL
SL AMB POCT BLOOD,UA: NORMAL
SL AMB POCT CLARITY,UA: CLEAR
SL AMB POCT COLOR,UA: YELLOW
SL AMB POCT KETONES,UA: NORMAL
SL AMB POCT NITRITE,UA: NORMAL
SL AMB POCT PH,UA: 6
SL AMB POCT SPECIFIC GRAVITY,UA: 1.02
SL AMB POCT URINE PROTEIN: NORMAL
SL AMB POCT UROBILINOGEN: 0.2

## 2023-11-14 PROCEDURE — 81003 URINALYSIS AUTO W/O SCOPE: CPT | Performed by: UROLOGY

## 2023-11-14 PROCEDURE — 99213 OFFICE O/P EST LOW 20 MIN: CPT | Performed by: UROLOGY

## 2023-11-14 NOTE — PROGRESS NOTES
UROLOGY PROGRESS NOTE         NAME: Lester Rowan  AGE: 70 y.o. SEX: female  : 1960   MRN: [id number]    DATE: 2023  TIME: 12:10 PM    Assessment and Plan      Impression:   1. Other microscopic hematuria  -     POCT urine dip auto non-scope  -     US kidney and bladder; Future; Expected date: 2023         Plan: Patient had complete work-up including CT scan with and without IV contrast in July of last year as well as a cystoscopy with Dr. Amelia Love in 2022. Unremarkable evaluation for hematuria. Her urinalysis today dip 1+ positive for blood as it has in the past.  She is on a blood thinner for history of DVT/PE. She should continue to have annual urinalyses and follow-up visit. She understands that if she develops any gross hematuria or new urinary symptoms, that she should contact us promptly and we will see her for a visit and further evaluation. Chief Complaint     Chief Complaint   Patient presents with   • Follow-up     Annual visit       History of Present Illness     HPI: Lester Rowan is a 70y.o. year old female who presents with history of microhematuria and a remote history of gross hematuria. She has undergone cystoscopies on 2 occasions in the past the most recent of which was with Dr. Amelia Love in November of last . She has a longstanding history of benign essential microhematuria and had undergone evaluation at both [facility] as well as [facility] previously. The most recent evaluation was last 2022 with CT scans as well as a cystoscopy as noted above. She is here for her annual visit and denies any issues since that time. Urinalysis today revealed 1+ blood and no evidence of infection or other issue. He denies any gross hematuria since the last visit with Dr. Amelia Love.               The following portions of the patient's history were reviewed and updated as appropriate: allergies, current medications, past family history, past medical history, past social history, past surgical history and problem list.  Past Medical History:   Diagnosis Date   • Diabetes mellitus ([address])    • GERD (gastroesophageal reflux disease)    • Hyperlipidemia    • Hypothyroidism    • Osteoarthritis    • Primary hypertension    • Spondylosis      Past Surgical History:   Procedure Laterality Date   • APPENDECTOMY     •  SECTION     • CHOLECYSTECTOMY     • HERNIA REPAIR     • TOE SURGERY     • TONSILLECTOMY     • TUBAL LIGATION       shoulder  Review of Systems     Const: Denies chills, fever and weight loss. CV: Denies chest pain. Resp: Denies SOB. GI: Denies abdominal pain, nausea and vomiting. : Denies symptoms other than stated above. Musculo: Denies back pain. Objective   /98 (BP Location: Left arm, Patient Position: Sitting)   Pulse 85   Temp (!) 97.3 °F (36.3 °C)   Wt 115 kg (253 lb 3.2 oz)   SpO2 100%   BMI 40.87 kg/m²     Physical Exam  Const: Appears healthy and well developed. No signs of acute distress present. Resp: Respirations are regular and unlabored. CV: Rate is regular. Rhythm is regular. Abdomen: Abdomen is soft, nontender, and nondistended. Kidneys are not palpable. : Not performed  Psych: Patient's attitude is cooperative.  Mood is normal. Affect is normal.    Procedure   Procedures     Current Medications     Current Outpatient Medications:   •  apixaban (Eliquis) 5 mg, Take 1 tablet (5 mg total) by mouth 2 (two) times a day, Disp: 180 tablet, Rfl: 3  •  Cholecalciferol 1.25 MG (34587 UT) capsule, Takes on , but did not take it 22, Disp: , Rfl:   •  levothyroxine 125 mcg tablet, Take 125 mcg by mouth daily, Disp: , Rfl:   •  semaglutide, 0.25 or 0.5 mg/dose, (Ozempic, 0.25 or 0.5 MG/DOSE,) 2 mg/3 mL injection pen, Start: 23 12:50:00 EDT, 3 mL, INJECT 0.5 MG SUBCUTANEOUSLY WEEKLY, Supply, Disp: , Rfl:   •  lisinopril (ZESTRIL) 10 mg tablet, Take 10 mg by mouth daily (Patient not taking: Reported on 2022), Disp: , Rfl:         Hanh Polk MD

## 2023-11-18 ENCOUNTER — HOSPITAL ENCOUNTER (OUTPATIENT)
Dept: ULTRASOUND IMAGING | Facility: HOSPITAL | Age: 63
Discharge: HOME | End: 2023-11-18
Attending: UROLOGY
Payer: MEDICARE

## 2023-11-18 DIAGNOSIS — R31.29 OTHER MICROSCOPIC HEMATURIA: ICD-10-CM

## 2023-11-18 PROCEDURE — 76775 US EXAM ABDO BACK WALL LIM: CPT

## 2024-04-22 LAB
CREAT ?TM UR-SCNC: 110.14 UMOL/L
HBA1C MFR BLD HPLC: 6.3 %
MICROALBUMIN/CREAT UR: <29.9 MG/G{CREAT}

## 2024-07-18 LAB — HBA1C MFR BLD HPLC: 6.9 %

## 2024-09-09 ENCOUNTER — HOSPITAL ENCOUNTER (EMERGENCY)
Facility: HOSPITAL | Age: 64
Discharge: HOME | End: 2024-09-09
Attending: EMERGENCY MEDICINE
Payer: MEDICARE

## 2024-09-09 ENCOUNTER — APPOINTMENT (EMERGENCY)
Dept: NON INVASIVE DIAGNOSTICS | Facility: HOSPITAL | Age: 64
End: 2024-09-09
Payer: MEDICARE

## 2024-09-09 VITALS
TEMPERATURE: 97.5 F | BODY MASS INDEX: 41.17 KG/M2 | OXYGEN SATURATION: 97 % | DIASTOLIC BLOOD PRESSURE: 79 MMHG | WEIGHT: 255.07 LBS | RESPIRATION RATE: 18 BRPM | SYSTOLIC BLOOD PRESSURE: 173 MMHG | HEART RATE: 111 BPM

## 2024-09-09 DIAGNOSIS — S76.319A HAMSTRING STRAIN: Primary | ICD-10-CM

## 2024-09-09 PROCEDURE — 93971 EXTREMITY STUDY: CPT

## 2024-09-09 PROCEDURE — 99283 EMERGENCY DEPT VISIT LOW MDM: CPT

## 2024-09-09 PROCEDURE — 93971 EXTREMITY STUDY: CPT | Performed by: SURGERY

## 2024-09-09 PROCEDURE — 99284 EMERGENCY DEPT VISIT MOD MDM: CPT | Performed by: EMERGENCY MEDICINE

## 2024-09-09 NOTE — DISCHARGE INSTRUCTIONS
Return to the ER  immediately for any worsening symptoms. Follow up with your doctor for further evaluation and management.

## 2024-09-09 NOTE — ED PROVIDER NOTES
History  Chief Complaint   Patient presents with    Leg Pain     Pt arrives from home with c/o left upper leg pain x2 weeks. Pt reports feeling a cramp and then attempting to stretch leg, pt having pain ever since. Denies swelling/redness.      This is a 64-year-old female presenting to the ED for evaluation of left upper thigh pain for 2 weeks.  Patient states that she was initially stretching her left lower extremity when she felt a cramp which has not improved.  She states she has been having pain since.  Pain is 8 out of 10 worse with ambulation.  She denies any swelling or redness.          Prior to Admission Medications   Prescriptions Last Dose Informant Patient Reported? Taking?   Cholecalciferol 1.25 MG (87087 UT) capsule   Yes No   Sig: Takes on , but did not take it 22   apixaban (Eliquis) 5 mg   No No   Sig: Take 1 tablet (5 mg total) by mouth 2 (two) times a day   levothyroxine 125 mcg tablet  Self Yes No   Sig: Take 125 mcg by mouth daily   lisinopril (ZESTRIL) 10 mg tablet   Yes No   Sig: Take 10 mg by mouth daily   Patient not taking: Reported on 2022   semaglutide, 0.25 or 0.5 mg/dose, (Ozempic, 0.25 or 0.5 MG/DOSE,) 2 mg/3 mL injection pen   Yes No   Sig: Start: 23 12:50:00 EDT, 3 mL, INJECT 0.5 MG SUBCUTANEOUSLY WEEKLY, Supply      Facility-Administered Medications: None       Past Medical History:   Diagnosis Date    Diabetes mellitus (HCC)     GERD (gastroesophageal reflux disease)     Hyperlipidemia     Hypothyroidism     Osteoarthritis     Primary hypertension     Spondylosis        Past Surgical History:   Procedure Laterality Date    APPENDECTOMY       SECTION      CHOLECYSTECTOMY      HERNIA REPAIR      TOE SURGERY      TONSILLECTOMY      TUBAL LIGATION         Family History   Problem Relation Age of Onset    Colon cancer Father     Prostate cancer Mother     Pulmonary embolism Cousin      I have reviewed and agree with the history as  documented.    E-Cigarette/Vaping    E-Cigarette Use Never User      E-Cigarette/Vaping Substances    Nicotine No     THC No     CBD No     Flavoring No     Other No     Unknown No      Social History     Tobacco Use    Smoking status: Never    Smokeless tobacco: Never   Vaping Use    Vaping status: Never Used   Substance Use Topics    Alcohol use: Never    Drug use: Never       Review of Systems   Constitutional:  Negative for chills and fever.   HENT:  Negative for ear pain and sore throat.    Eyes:  Negative for pain and visual disturbance.   Respiratory:  Negative for cough and shortness of breath.    Cardiovascular:  Negative for chest pain and palpitations.   Gastrointestinal:  Negative for abdominal pain and vomiting.   Genitourinary:  Negative for dysuria and hematuria.   Musculoskeletal:  Positive for myalgias. Negative for arthralgias and back pain.   Skin:  Negative for color change and rash.   Neurological:  Negative for seizures and syncope.   All other systems reviewed and are negative.      Physical Exam  Physical Exam  Vitals and nursing note reviewed.   Constitutional:       General: She is not in acute distress.     Appearance: Normal appearance. She is well-developed and normal weight.   HENT:      Head: Normocephalic and atraumatic.      Right Ear: External ear normal.      Left Ear: External ear normal.      Nose: Nose normal.   Eyes:      Extraocular Movements: Extraocular movements intact.      Conjunctiva/sclera: Conjunctivae normal.   Neck:      Vascular: No carotid bruit.   Cardiovascular:      Rate and Rhythm: Normal rate and regular rhythm.      Heart sounds: No murmur heard.  Pulmonary:      Effort: Pulmonary effort is normal. No respiratory distress.      Breath sounds: Normal breath sounds.   Abdominal:      Palpations: Abdomen is soft.      Tenderness: There is no abdominal tenderness.   Musculoskeletal:         General: Tenderness present. No swelling or signs of injury.       Cervical back: Normal range of motion and neck supple. No rigidity or tenderness.      Comments: +tenderness to the posterior left thigh   Lymphadenopathy:      Cervical: No cervical adenopathy.   Skin:     General: Skin is warm and dry.      Capillary Refill: Capillary refill takes less than 2 seconds.   Neurological:      General: No focal deficit present.      Mental Status: She is alert and oriented to person, place, and time.   Psychiatric:         Mood and Affect: Mood normal.         Vital Signs  ED Triage Vitals [09/09/24 1412]   Temperature Pulse Respirations Blood Pressure SpO2   97.5 °F (36.4 °C) (!) 111 18 (!) 173/79 97 %      Temp Source Heart Rate Source Patient Position - Orthostatic VS BP Location FiO2 (%)   Temporal Monitor Sitting Right arm --      Pain Score       No Pain           Vitals:    09/09/24 1412   BP: (!) 173/79   Pulse: (!) 111   Patient Position - Orthostatic VS: Sitting         Visual Acuity      ED Medications  Medications - No data to display    Diagnostic Studies  Results Reviewed       None                   VAS VENOUS DUPLEX -LOWER LIMB UNILATERAL    (Results Pending)              Procedures  Procedures         ED Course                                 SBIRT 22yo+      Flowsheet Row Most Recent Value   Initial Alcohol Screen: US AUDIT-C     1. How often do you have a drink containing alcohol? 0 Filed at: 09/09/2024 1446   2. How many drinks containing alcohol do you have on a typical day you are drinking?  0 Filed at: 09/09/2024 1446   3b. FEMALE Any Age, or MALE 65+: How often do you have 4 or more drinks on one occassion? 0 Filed at: 09/09/2024 1446   Audit-C Score 0 Filed at: 09/09/2024 1446   JOY: How many times in the past year have you...    Used an illegal drug or used a prescription medication for non-medical reasons? Never Filed at: 09/09/2024 1446                      Medical Decision Making  Differential diagnosis includes but not limited to: DVT, bakers cyst, muscle  spasm, hamstring strain.                   Disposition  Final diagnoses:   Hamstring strain     Time reflects when diagnosis was documented in both MDM as applicable and the Disposition within this note       Time User Action Codes Description Comment    9/9/2024  2:58 PM Halley Metcalf Add [S76.319A] Hamstring strain           ED Disposition       ED Disposition   Discharge    Condition   Stable    Date/Time   Mon Sep 9, 2024 1456    Comment   Shakira Sánchez discharge to home/self care.                   Follow-up Information       Follow up With Specialties Details Why Contact Info    Marybel Garcia MD    [address] [place] [place] [address]  [phone]      Roney Neff DO Orthopedic Surgery   [address]  [address]  [place] [address]  [phone]              Patient's Medications   Discharge Prescriptions    No medications on file       No discharge procedures on file.    PDMP Review         Value Time User    PDMP Reviewed  Yes 7/11/2022  4:39 PM Medhat Boateng MD            ED Provider  Electronically Signed by             Halley Metcalf DO  09/09/24 1500

## 2024-09-11 ENCOUNTER — OFFICE VISIT (OUTPATIENT)
Dept: FAMILY MEDICINE CLINIC | Facility: CLINIC | Age: 64
End: 2024-09-11
Payer: MEDICARE

## 2024-09-11 VITALS
WEIGHT: 248.24 LBS | DIASTOLIC BLOOD PRESSURE: 81 MMHG | SYSTOLIC BLOOD PRESSURE: 121 MMHG | TEMPERATURE: 97.9 F | HEART RATE: 79 BPM | OXYGEN SATURATION: 99 % | BODY MASS INDEX: 40.07 KG/M2

## 2024-09-11 DIAGNOSIS — H93.13 TINNITUS AURIUM, BILATERAL: ICD-10-CM

## 2024-09-11 DIAGNOSIS — Z76.89 ENCOUNTER TO ESTABLISH CARE: ICD-10-CM

## 2024-09-11 DIAGNOSIS — E11.9 DM TYPE 2, GOAL HBA1C 7%-8% (HCC): Primary | ICD-10-CM

## 2024-09-11 DIAGNOSIS — M75.81 ROTATOR CUFF TENDINITIS, RIGHT: ICD-10-CM

## 2024-09-11 DIAGNOSIS — E78.2 MIXED HYPERLIPIDEMIA: ICD-10-CM

## 2024-09-11 DIAGNOSIS — I10 ESSENTIAL HYPERTENSION: ICD-10-CM

## 2024-09-11 DIAGNOSIS — Z79.01 CURRENT USE OF LONG TERM ANTICOAGULATION: ICD-10-CM

## 2024-09-11 DIAGNOSIS — M65.4 TENOSYNOVITIS, DE QUERVAIN: ICD-10-CM

## 2024-09-11 DIAGNOSIS — E08.41 DIABETIC MONONEUROPATHY ASSOCIATED WITH DIABETES MELLITUS DUE TO UNDERLYING CONDITION (HCC): ICD-10-CM

## 2024-09-11 DIAGNOSIS — D68.51 FACTOR V LEIDEN MUTATION (HCC): ICD-10-CM

## 2024-09-11 PROCEDURE — 99204 OFFICE O/P NEW MOD 45 MIN: CPT | Performed by: PHYSICIAN ASSISTANT

## 2024-09-11 PROCEDURE — G2211 COMPLEX E/M VISIT ADD ON: HCPCS | Performed by: PHYSICIAN ASSISTANT

## 2024-09-11 RX ORDER — ROSUVASTATIN CALCIUM 5 MG/1
5 TABLET, COATED ORAL DAILY
Qty: 100 TABLET | Refills: 3 | Status: SHIPPED | OUTPATIENT
Start: 2024-09-11

## 2024-09-11 NOTE — PROGRESS NOTES
Assessment/Plan:       1. DM type 2, goal HbA1c 7%-8% ([facility])  -     rosuvastatin (CRESTOR) 5 mg tablet; Take 1 tablet (5 mg total) by mouth daily  -     Albumin / creatinine urine ratio; Future; Expected date: 01/09/2025  -     Basic metabolic panel; Future; Expected date: 01/09/2025  -     Hemoglobin A1C; Future; Expected date: 01/09/2025  -     Lipid Panel with Direct LDL reflex; Future; Expected date: 01/09/2025  2. Tinnitus aurium, bilateral  -     Ambulatory Referral to Otolaryngology; Future  3. Mixed hyperlipidemia  -     rosuvastatin (CRESTOR) 5 mg tablet; Take 1 tablet (5 mg total) by mouth daily  -     Lipid Panel with Direct LDL reflex; Future; Expected date: 01/09/2025  4. BMI 40.0-44.9, adult ([facility])  5. Factor V Leiden mutation ([facility])  6. Current use of long term anticoagulation  7. Rotator cuff tendinitis, right  8. Diabetic mononeuropathy associated with diabetes mellitus due to underlying condition ([facility])  9. Tenosynovitis, de Quervain  10. Encounter to establish care      This 64-year-old female is establishing care at this practice.  I previously knew this patient from a volleyball leg more than 20 years ago.  She attempted to establish care with me at [facility] but at that time, I had already relocated to this new practice.  She had been receiving her medical care at [facility] including colonoscopy in August 2022, recent gynecologic exam with pelvic and Pap smear, and physical therapy due to her right rotator cuff tendinitis.  We are attempting to get these records from that facility.  Patient has a history of Leiden factor V and has had a pulmonary embolism in the past.  She was recently referred to the emergency department with right thigh tightness and a concern had been that she had another pulmonary embolism despite long-term Eliquis therapy.  She is adherent with her medications.    Patient has gained weight over the last couple decades.  She also developed type 2 diabetes.  Her  current BMI is at 40.  Patient states that she has lost 15 pounds since Ozempic was started for her.  She subsequently hit a plateau for her weight loss but her hemoglobin A1c is now under better control at 6.9%.  I was able to see these results on her phone from her patient portal.  She also showed me her TSH level from [facility] which was 1.39% in April.  Patient's vitamin D level was at 40 during the same blood draw.  She had been on vitamin D replacement but has been taken off of this because her vitamin D level has normalized.    Patient recently had a DEXA scan at [facility] which was normal.  She has had a diagnosis of osteoporosis for at least 5 years but this was her first DEXA scan and the patient states it was normal.  We are going to get the results of the scan from her she also.  She has never been treated for osteoporosis and has not had any fractures.    Patient's mother  of colon cancer.  Patient gets colonoscopies on a regular basis.  Her most recent colonoscopy was in 2022.  There were no polyps.  She is now due for repeat colonoscopy in .    The patient had been taking atorvastatin due to hyperlipidemia but developed arthralgias in both knees.  She had knee injections performed thinking it was due to osteoarthritis.  When she went off of the atorvastatin, her knees no longer bothered her.  As part of shared decision making, the patient is willing to try rosuvastatin which is a water soluble lipid-lowering agent that has fewer musculoskeletal complications.    Patient has been getting physical therapy on her right rotator cuff due to tendinitis.  She is right-handed dominant.  She states that the therapy has helped but she is not fully recovered.    Patient works as a .  Earlier this week, she was stamping a lot of papers and developed pain on the right first dorsal compartment.  Finkelstein test was positive consistent with de Quervain  tenosynovitis.  She applied a wrist splint that encompassed her thumb and this is causing the pain to be less.  She is also taking Tylenol as needed.  She is going to alternate heat and ice to see which is better.    Patient has a history of hypertension but is currently having a controlled blood pressure without medication.  She did get lightheaded when she was on lisinopril.  Patient states that she is not able to tolerate metformin.  She seems to be doing very well with Ozempic although she sometimes has issues with some nausea.  Hemoglobin A1c was most recently at 6.9% in July.    Patient's daughter was tested for Leiden factor V and she is also affected.  Patient has not had any unexpected bleeding with the use of Eliquis and realizes that she will be taking this the rest of her life.  She is having some financial struggles paying for both Eliquis and Ozempic but she is continuing to work and not getting any assistance because her income is too high.  She is approaching the donut hole so she will not have to pay additional money once she reaches her maximum out of pocket drug medication cost.    Patient is complaining of bilateral tinnitus.  She said that her previous PCP was sending her for hearing aid evaluation.  I am going to have the patient see ear nose and throat initially and she has a relationship with Destiny Mccartney PA-C who I am referring her to.  Her Bernal test lateralized to the left ear and Joyce test showed AC greater than BC bilaterally.    A total of 55 minutes was spent rendering care for this patient.  This time included review of the patient's electronic medical record, performing the history and physical, reviewing appropriate labs and/or images, developing a treatment and assessment plan, answering patient's questions and concerns, and documenting the patient visit.  I will see the patient in 2 weeks to do her Medicare annual wellness.  In 4 months, we will do a diabetic recheck  including BMP hemoglobin A1c urine for microalbumin and a repeat lipid profile.  Subjective:      Patient ID: Shakira Sánchez is a 64 y.o. female.    HPI: Well-developed well-nourished 64-year-old female who is alert cooperative and an excellent historian.  We had known each other socially for more than 20 years.    Patient has not very active physically.  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  She also does income tax preparation.  We talked about other ways that she can get some exercise in without running into people that she knows.  She is going to consider becoming involved with increasing activities.  We talked about how insulin resistance is less in the setting of increased activity even if her weight does not change.    She is not having any chest pain heart palpitations episodes of dizziness or lightheadedness.  No syncope or near syncope.  She has occasional constipation which she thinks may be caused by the use of Ozempic.    No difficulty passing her water.  No postmenopausal bleeding.  Not having any further knee pain after stopping the atorvastatin.    The following portions of the patient's history were reviewed and updated as appropriate: allergies, current medications, past family history, past medical history, past social history, past surgical history, and problem list.    Review of Systems no recent URI or viral syndrome.  Continues to have tinnitus.  Continues to have right rotator cuff tendinitis symptoms and recently developed right wrist pain secondary to de Quervain's tenosynovitis.    Objective:      /81 (BP Location: Right arm, Patient Position: Sitting, Cuff Size: Large)   Pulse 79   Temp 97.9 °F (36.6 °C) (Tympanic)   Wt 113 kg (248 lb 3.8 oz)   SpO2 99%   BMI 40.07 kg/m²          Physical Exam  Cardiovascular:      Pulses: no weak pulses.           Dorsalis pedis pulses are 2+ on the right side and 2+ on the  left side.        Posterior tibial pulses are 2+ on the right side and 2+ on the left side.   Feet:      Right foot:      Skin integrity: No ulcer, skin breakdown, erythema, warmth, callus or dry skin.      Left foot:      Skin integrity: No ulcer, skin breakdown, erythema, warmth, callus or dry skin.      well-developed well-nourished obese 64-year-old female who capably answers questions.  Reviewed vital signs.  She is normotensive and afebrile.    Bernal test lateralizes to the left.  Joyce test shows AC greater than BC bilaterally.    Heart is regular rate without murmur rub or gallop.  Lungs are clear to auscultation.    Abdomen obese and soft positive bowel sounds without masses or tenderness.    Diabetic Foot Exam    Patient's shoes and socks removed.    Right Foot/Ankle   Right Foot Inspection  Skin Exam: skin normal and skin intact. No dry skin, no warmth, no callus, no erythema, no maceration, no abnormal color, no pre-ulcer, no ulcer and no callus.     Toe Exam: ROM and strength within normal limits.     Sensory   Vibration: intact  Monofilament testing: intact    Vascular  Capillary refills: < 3 seconds  The right DP pulse is 2+. The right PT pulse is 2+.     Left Foot/Ankle  Left Foot Inspection  Skin Exam: skin normal and skin intact. No dry skin, no warmth, no erythema, no maceration, normal color, no pre-ulcer, no ulcer and no callus.     Toe Exam: ROM and strength within normal limits.     Sensory   Vibration: intact  Monofilament testing: intact    Vascular  Capillary refills: < 3 seconds  The left DP pulse is 2+. The left PT pulse is 2+.     Assign Risk Category  No deformity present  No loss of protective sensation  No weak pulses  Risk: 0

## 2024-09-20 ENCOUNTER — TELEPHONE (OUTPATIENT)
Age: 64
End: 2024-09-20

## 2024-09-20 NOTE — TELEPHONE ENCOUNTER
Patient called to schedule with Dr. Raphael's office. Provided her with his number to call and schedule.

## 2024-09-25 ENCOUNTER — OFFICE VISIT (OUTPATIENT)
Dept: FAMILY MEDICINE CLINIC | Facility: CLINIC | Age: 64
End: 2024-09-25
Payer: MEDICARE

## 2024-09-25 VITALS
WEIGHT: 253.53 LBS | TEMPERATURE: 97.9 F | HEART RATE: 79 BPM | OXYGEN SATURATION: 98 % | BODY MASS INDEX: 40.92 KG/M2 | DIASTOLIC BLOOD PRESSURE: 64 MMHG | SYSTOLIC BLOOD PRESSURE: 132 MMHG

## 2024-09-25 DIAGNOSIS — I10 PRIMARY HYPERTENSION: ICD-10-CM

## 2024-09-25 DIAGNOSIS — Z79.01 CURRENT USE OF LONG TERM ANTICOAGULATION: ICD-10-CM

## 2024-09-25 DIAGNOSIS — E78.2 MIXED HYPERLIPIDEMIA: ICD-10-CM

## 2024-09-25 DIAGNOSIS — D68.51 FACTOR V LEIDEN MUTATION (HCC): ICD-10-CM

## 2024-09-25 DIAGNOSIS — Z00.00 MEDICARE ANNUAL WELLNESS VISIT, INITIAL: ICD-10-CM

## 2024-09-25 DIAGNOSIS — E08.41 DIABETIC MONONEUROPATHY ASSOCIATED WITH DIABETES MELLITUS DUE TO UNDERLYING CONDITION (HCC): Primary | ICD-10-CM

## 2024-09-25 DIAGNOSIS — E11.9 DM TYPE 2, GOAL HBA1C 7%-8% (HCC): ICD-10-CM

## 2024-09-25 PROBLEM — I26.99 ACUTE PULMONARY EMBOLISM (HCC): Status: RESOLVED | Noted: 2022-07-10 | Resolved: 2024-09-25

## 2024-09-25 PROCEDURE — G0438 PPPS, INITIAL VISIT: HCPCS | Performed by: PHYSICIAN ASSISTANT

## 2024-09-25 NOTE — PROGRESS NOTES
Ambulatory Visit  Name: Shakira Sánchez      : 1960      MRN: [id number]  Encounter Provider: Rubi Lehman PA-C  Encounter Date: 2024   Encounter department: [facility] PRIMARY CARE    Assessment & Plan  Diabetic mononeuropathy associated with diabetes mellitus due to underlying condition (HCC)  Patient continues to have decreased sensation at the second metatarsal head of both feet.  She carefully monitors the appearance of her feet because of this decree sensation.  Lab Results   Component Value Date    HGBA1C 6.9 2024       Orders:    IRIS Diabetic eye exam  Patient has not had a diabetic iris exam in the past and had one at the office today.  DM type 2, goal HbA1c 7%-8% ([facility])  Patient is well-controlled with a hemoglobin A1c of 6.9%.  She previously could not tolerate metformin due to GI distress.  When she was taking Farxiga, she developed pulmonary embolism and she subsequently was found to have a hypercoagulable state for this agent was not restarted.  Due to her excellent control, I backed down her Ozempic from 0.5-0.25 weekly injection.  Lab Results   Component Value Date    HGBA1C 6.9 2024            Medicare annual wellness visit, initial  Patient is now 64 during  in February.  Her previous provider did not do her Medicare wellness so this is her initial exam.  We reviewed her answers as part of today's visit.       Factor V Leiden mutation ([facility])  Patient had pulmonary embolism and hypercoagulable workup revealed presence of Leiden factor V.  Her daughter also carries this mutation.  She is heterozygous.  She is going to need a lifetime of anticoagulation.       Current use of long term anticoagulation  Patient on Eliquis twice daily without complications.       Primary hypertension  Blood pressure well-controlled at 132/64.  We will continue to monitor.       Mixed hyperlipidemia  I performed the patient's 10-year ASCVD risk score.  The score was  15.2%.  I started the patient on Crestor at her last visit.  We will be repeating her lipid level in 4 months to see the effect the Crestor has on this elevated lipid risk and diabetes.     Patient is interested in getting the Novavax COVID booster and her flu shot.  She wants to wait until the end of October in order to optimize coverage.  Her daughter is a pharmacist at [facility] in [place] and she will be getting these vaccines at her pharmacy.   A total of 30 minutes was spent rendering care for this patient.  This time included review of the patient's electronic medical record, performing the history and physical, reviewing appropriate labs and/or images, developing a treatment and assessment plan, answering patient's questions and concerns, and documenting the patient visit.  I will see the patient in 4 months for her diabetic recheck.  Patient is having her diabetic iris exam scan done as part of today's visit.      Preventive health issues were discussed with patient, and age appropriate screening tests were ordered as noted in patient's After Visit Summary. Personalized health advice and appropriate referrals for health education or preventive services given if needed, as noted in patient's After Visit Summary.    History of Present Illness     HPI: 64-year-old female who I have known for the last 40 years from her previous volleyball league where we were both participants.  She recently establish care 2 weeks ago.  Patient continues to work as a .  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    Patient is not having any urinary leakage.  She had a bladder suspension with mesh approximately 10 years ago and this continues to be effective therapy for her.    She has no pain in her chest heart palpitations dizziness or lightheadedness.  No syncope or near syncope.  No changes in her bowel or bladder habits.  No melena or hematochezia.  Patient Care Team:  Rubi Lehman PA-C as  PCP - General (Family Medicine)  Dustin Porter MD    Review of Systems: No recent URI or viral syndrome.  Medical History Reviewed by provider this encounter:  Tobacco  Allergies  Meds  Problems  Med Hx  Surg Hx  Fam Hx       Annual Wellness Visit Questionnaire       Health Risk Assessment:   Patient rates overall health as good. Patient feels that their physical health rating is slightly better. Patient is satisfied with their life. Eyesight was rated as slightly worse. Hearing was rated as slightly worse. Patient feels that their emotional and mental health rating is same. Patients states they are never, rarely angry. Patient states they are often unusually tired/fatigued. Pain experienced in the last 7 days has been some. Patient's pain rating has been 2/10. Patient states that she has experienced no weight loss or gain in last 6 months.     Fall Risk Screening:   In the past year, patient has experienced: no history of falling in past year      Urinary Incontinence Screening:   Patient has not leaked urine accidently in the last six months.     Home Safety:  Patient has trouble with stairs inside or outside of their home. Patient has working smoke alarms and has working carbon monoxide detector. Home safety hazards include: none.     Nutrition:   Current diet is Regular.     Medications:   Patient is currently taking over-the-counter supplements. OTC medications include: see medication list. Patient is able to manage medications.     Activities of Daily Living (ADLs)/Instrumental Activities of Daily Living (IADLs):   Walk and transfer into and out of bed and chair?: Yes  Dress and groom yourself?: Yes    Bathe or shower yourself?: Yes    Feed yourself? Yes  Do your laundry/housekeeping?: Yes  Manage your money, pay your bills and track your expenses?: Yes  Make your own meals?: Yes    Do your own shopping?: Yes    Previous Hospitalizations:   Any hospitalizations or ED visits within the last 12 months?: No       PREVENTIVE SCREENINGS      Cardiovascular Screening:    General: Screening Not Indicated and History Lipid Disorder      Diabetes Screening:     General: Screening Not Indicated and History Diabetes      Colorectal Cancer Screening:     General: Screening Current      Breast Cancer Screening:     General: Screening Current      Cervical Cancer Screening:    General: Screening Not Indicated      Osteoporosis Screening:    General: Screening Current      Lung Cancer Screening:     General: Screening Not Indicated    Screening, Brief Intervention, and Referral to Treatment (SBIRT)    Screening  Typical number of drinks in a day: 0  Typical number of drinks in a week: 0  Interpretation: Low risk drinking behavior.    Single Item Drug Screening:  How often have you used an illegal drug (including marijuana) or a prescription medication for non-medical reasons in the past year? never    Single Item Drug Screen Score: 0  Interpretation: Negative screen for possible drug use disorder    Social Determinants of Health     Food Insecurity: No Food Insecurity (9/25/2024)    Hunger Vital Sign     Worried About Running Out of Food in the Last Year: Never true     Ran Out of Food in the Last Year: Never true   Transportation Needs: No Transportation Needs (9/25/2024)    PRAPARE - Transportation     Lack of Transportation (Medical): No     Lack of Transportation (Non-Medical): No   Housing Stability: Low Risk  (9/25/2024)    Housing Stability Vital Sign     Unable to Pay for Housing in the Last Year: No     Number of Times Moved in the Last Year: 0     Homeless in the Last Year: No   Utilities: Not At Risk (9/25/2024)    [facility] Utilities     Threatened with loss of utilities: No     No results found.    Objective     /64 (BP Location: Right arm, Patient Position: Sitting, Cuff Size: Standard)   Pulse 79   Temp 97.9 °F (36.6 °C) (Tympanic)   Wt 115 kg (253 lb 8.5 oz)   SpO2 98%   BMI 40.92 kg/m²     Physical  Exam  Cardiovascular:      Pulses: no weak pulses.           Dorsalis pedis pulses are 2+ on the right side and 2+ on the left side.        Posterior tibial pulses are 2+ on the right side and 2+ on the left side.   Musculoskeletal:        Feet:    Feet:      Right foot:      Skin integrity: No ulcer, skin breakdown, erythema, warmth, callus or dry skin.      Left foot:      Skin integrity: No ulcer, skin breakdown, erythema, warmth, callus or dry skin.     : Reviewed vital signs.  She is normotensive and afebrile.  She is pleasant and cooperative and capably answers questions.    Well-developed well-nourished 64 y.o. year old female who is cooperative with the exam.  Patient is alert and oriented x3.  Patient is appropriate in answering all questions.    HEENT:  Normocephalic.  PERRLA.  EOMs intact.  TMs are clear with identification of bony landmarks.  No tragus or pinnae tenderness.  No pre or posterior auricular adenopathy.  Sinuses without tenderness.  Throat without hyperemia.  Neck:  Supple without adenopathy.  Thyroid midline without thyromegaly or bruits.  No carotid bruits.  Chest symmetric and nontender.  Heart regular rate and rhythm.  No murmur rubs or gallops.  Point of maximum impulse not displaced.  Lungs are clear to auscultation.  Breathing is nonlabored.  Aerating bases well.  Abdomen round and soft positive bowel sounds without masses tenderness or organomegaly.  Extremities reveal adequate peripheral pulses without peripheral edema.    Diabetic Foot Exam    Patient's shoes and socks removed.    Right Foot/Ankle   Right Foot Inspection  Skin Exam: skin normal and skin intact. No dry skin, no warmth, no callus, no erythema, no maceration, no abnormal color, no pre-ulcer, no ulcer and no callus.     Toe Exam: ROM and strength within normal limits.     Sensory   Vibration: intact  Monofilament testing: intact    Vascular  The right DP pulse is 2+. The right PT pulse is 2+.     Left Foot/Ankle  Left  Foot Inspection  Skin Exam: skin normal and skin intact. No dry skin, no warmth, no erythema, no maceration, normal color, no pre-ulcer, no ulcer and no callus.     Toe Exam: ROM and strength within normal limits.     Sensory   Vibration: intact  Monofilament testing: intact    Vascular  The left DP pulse is 2+. The left PT pulse is 2+.     Assign Risk Category  No deformity present  Loss of protective sensation  No weak pulses  Risk: 1      Patient is unable to feel monofilament strength testing at the base of her second metatarsal head on both feet.  She was fully sensate in all of the areas of the foot.

## 2024-09-25 NOTE — PATIENT INSTRUCTIONS
Medicare Preventive Visit Patient Instructions  Thank you for completing your Welcome to Medicare Visit or Medicare Annual Wellness Visit today. Your next wellness visit will be due in one year (9/26/2025).  The screening/preventive services that you may require over the next 5-10 years are detailed below. Some tests may not apply to you based off risk factors and/or age. Screening tests ordered at today's visit but not completed yet may show as past due. Also, please note that scanned in results may not display below.  Preventive Screenings:  Service Recommendations Previous Testing/Comments   Colorectal Cancer Screening  * Colonoscopy    * Fecal Occult Blood Test (FOBT)/Fecal Immunochemical Test (FIT)  * Fecal DNA/Cologuard Test  * Flexible Sigmoidoscopy Age: 45-75 years old   Colonoscopy: every 10 years (may be performed more frequently if at higher risk)  OR  FOBT/FIT: every 1 year  OR  Cologuard: every 3 years  OR  Sigmoidoscopy: every 5 years  Screening may be recommended earlier than age 45 if at higher risk for colorectal cancer. Also, an individualized decision between you and your healthcare provider will decide whether screening between the ages of 76-85 would be appropriate. Colonoscopy: 08/09/2022  FOBT/FIT: Not on file  Cologuard: Not on file  Sigmoidoscopy: Not on file    Screening Current     Breast Cancer Screening Age: 40+ years old  Frequency: every 1-2 years  Not required if history of left and right mastectomy Mammogram: 05/28/2024    Screening Current   Cervical Cancer Screening Between the ages of 21-29, pap smear recommended once every 3 years.   Between the ages of 30-65, can perform pap smear with HPV co-testing every 5 years.   Recommendations may differ for women with a history of total hysterectomy, cervical cancer, or abnormal pap smears in past. Pap Smear: Not on file    Screening Not Indicated   Hepatitis C Screening Once for adults born between 1945 and 1965  More frequently in  patients at high risk for Hepatitis C Hep C Antibody: Not on file        Diabetes Screening 1-2 times per year if you're at risk for diabetes or have pre-diabetes Fasting glucose: 143 mg/dL (12/5/2022)  A1C: 6.9 (7/18/2024)  Screening Not Indicated  History Diabetes   Cholesterol Screening Once every 5 years if you don't have a lipid disorder. May order more often based on risk factors. Lipid panel: 08/17/2020    Screening Not Indicated  History Lipid Disorder     Other Preventive Screenings Covered by Medicare:  Abdominal Aortic Aneurysm (AAA) Screening: covered once if your at risk. You're considered to be at risk if you have a family history of AAA.  Lung Cancer Screening: covers low dose CT scan once per year if you meet all of the following conditions: (1) Age 55-77; (2) No signs or symptoms of lung cancer; (3) Current smoker or have quit smoking within the last 15 years; (4) You have a tobacco smoking history of at least 20 pack years (packs per day multiplied by number of years you smoked); (5) You get a written order from a healthcare provider.  Glaucoma Screening: covered annually if you're considered high risk: (1) You have diabetes OR (2) Family history of glaucoma OR (3)  aged 50 and older OR (4)  American aged 65 and older  Osteoporosis Screening: covered every 2 years if you meet one of the following conditions: (1) You're estrogen deficient and at risk for osteoporosis based off medical history and other findings; (2) Have a vertebral abnormality; (3) On glucocorticoid therapy for more than 3 months; (4) Have primary hyperparathyroidism; (5) On osteoporosis medications and need to assess response to drug therapy.   Last bone density test (DXA Scan): 05/28/2024.  HIV Screening: covered annually if you're between the age of 15-65. Also covered annually if you are younger than 15 and older than 65 with risk factors for HIV infection. For pregnant patients, it is covered up to 3  times per pregnancy.    Immunizations:  Immunization Recommendations   Influenza Vaccine Annual influenza vaccination during flu season is recommended for all persons aged >= 6 months who do not have contraindications   Pneumococcal Vaccine   * Pneumococcal conjugate vaccine = PCV13 (Prevnar 13), PCV15 (Vaxneuvance), PCV20 (Prevnar 20)  * Pneumococcal polysaccharide vaccine = PPSV23 (Pneumovax) Adults 19-63 yo with certain risk factors or if 65+ yo  If never received any pneumonia vaccine: recommend Prevnar 20 (PCV20)  Give PCV20 if previously received 1 dose of PCV13 or PPSV23   Hepatitis B Vaccine 3 dose series if at intermediate or high risk (ex: diabetes, end stage renal disease, liver disease)   Respiratory syncytial virus (RSV) Vaccine - COVERED BY MEDICARE PART D  * RSVPreF3 (Arexvy) CDC recommends that adults 60 years of age and older may receive a single dose of RSV vaccine using shared clinical decision-making (SCDM)   Tetanus (Td) Vaccine - COST NOT COVERED BY MEDICARE PART B Following completion of primary series, a booster dose should be given every 10 years to maintain immunity against tetanus. Td may also be given as tetanus wound prophylaxis.   Tdap Vaccine - COST NOT COVERED BY MEDICARE PART B Recommended at least once for all adults. For pregnant patients, recommended with each pregnancy.   Shingles Vaccine (Shingrix) - COST NOT COVERED BY MEDICARE PART B  2 shot series recommended in those 19 years and older who have or will have weakened immune systems or those 50 years and older     Health Maintenance Due:      Topic Date Due   • Hepatitis C Screening  Never done   • Breast Cancer Screening: Mammogram  05/28/2025   • DXA SCAN  05/28/2026   • Colorectal Cancer Screening  08/09/2027     Immunizations Due:      Topic Date Due   • Pneumococcal Vaccine: 65+ Years (2 of 2 - PCV) 04/21/2015   • COVID-19 Vaccine (4 - 2023-24 season) 09/01/2024   • Influenza Vaccine (1) 09/01/2024     Advance Directives    What are advance directives?  Advance directives are legal documents that state your wishes and plans for medical care. These plans are made ahead of time in case you lose your ability to make decisions for yourself. Advance directives can apply to any medical decision, such as the treatments you want, and if you want to donate organs.   What are the types of advance directives?  There are many types of advance directives, and each state has rules about how to use them. You may choose a combination of any of the following:  Living will:  This is a written record of the treatment you want. You can also choose which treatments you do not want, which to limit, and which to stop at a certain time. This includes surgery, medicine, IV fluid, and tube feedings.   Durable power of  for healthcare (DPAHC):  This is a written record that states who you want to make healthcare choices for you when you are unable to make them for yourself. This person, called a proxy, is usually a family member or a friend. You may choose more than 1 proxy.  Do not resuscitate (DNR) order:  A DNR order is used in case your heart stops beating or you stop breathing. It is a request not to have certain forms of treatment, such as CPR. A DNR order may be included in other types of advance directives.  Medical directive:  This covers the care that you want if you are in a coma, near death, or unable to make decisions for yourself. You can list the treatments you want for each condition. Treatment may include pain medicine, surgery, blood transfusions, dialysis, IV or tube feedings, and a ventilator (breathing machine).  Values history:  This document has questions about your views, beliefs, and how you feel and think about life. This information can help others choose the care that you would choose.  Why are advance directives important?  An advance directive helps you control your care. Although spoken wishes may be used, it is better to  have your wishes written down. Spoken wishes can be misunderstood, or not followed. Treatments may be given even if you do not want them. An advance directive may make it easier for your family to make difficult choices about your care.   Weight Management   Why it is important to manage your weight:  Being overweight increases your risk of health conditions such as heart disease, high blood pressure, type 2 diabetes, and certain types of cancer. It can also increase your risk for osteoarthritis, sleep apnea, and other respiratory problems. Aim for a slow, steady weight loss. Even a small amount of weight loss can lower your risk of health problems.  How to lose weight safely:  A safe and healthy way to lose weight is to eat fewer calories and get regular exercise. You can lose up about 1 pound a week by decreasing the number of calories you eat by 500 calories each day.   Healthy meal plan for weight management:  A healthy meal plan includes a variety of foods, contains fewer calories, and helps you stay healthy. A healthy meal plan includes the following:  Eat whole-grain foods more often.  A healthy meal plan should contain fiber. Fiber is the part of grains, fruits, and vegetables that is not broken down by your body. Whole-grain foods are healthy and provide extra fiber in your diet. Some examples of whole-grain foods are whole-wheat breads and pastas, oatmeal, brown rice, and bulgur.  Eat a variety of vegetables every day.  Include dark, leafy greens such as spinach, kale, yolette greens, and mustard greens. Eat yellow and orange vegetables such as carrots, sweet potatoes, and winter squash.   Eat a variety of fruits every day.  Choose fresh or canned fruit (canned in its own juice or light syrup) instead of juice. Fruit juice has very little or no fiber.  Eat low-fat dairy foods.  Drink fat-free (skim) milk or 1% milk. Eat fat-free yogurt and low-fat cottage cheese. Try low-fat cheeses such as mozzarella and  other reduced-fat cheeses.  Choose meat and other protein foods that are low in fat.  Choose beans or other legumes such as split peas or lentils. Choose fish, skinless poultry (chicken or turkey), or lean cuts of red meat (beef or pork). Before you cook meat or poultry, cut off any visible fat.   Use less fat and oil.  Try baking foods instead of frying them. Add less fat, such as margarine, sour cream, regular salad dressing and mayonnaise to foods. Eat fewer high-fat foods. Some examples of high-fat foods include french fries, doughnuts, ice cream, and cakes.  Eat fewer sweets.  Limit foods and drinks that are high in sugar. This includes candy, cookies, regular soda, and sweetened drinks.  Exercise:  Exercise at least 30 minutes per day on most days of the week. Some examples of exercise include walking, biking, dancing, and swimming. You can also fit in more physical activity by taking the stairs instead of the elevator or parking farther away from stores. Ask your healthcare provider about the best exercise plan for you.      © Copyright [employer] 2018 Information is for End User's use only and may not be sold, redistributed or otherwise used for commercial purposes. All illustrations and images included in CareNotes® are the copyrighted property of [employer].A[employer]., Inc. or [employer]

## 2024-09-25 NOTE — ASSESSMENT & PLAN NOTE
Patient had pulmonary embolism and hypercoagulable workup revealed presence of Leiden factor V.  Her daughter also carries this mutation.  She is heterozygous.  She is going to need a lifetime of anticoagulation.

## 2024-09-25 NOTE — ASSESSMENT & PLAN NOTE
I performed the patient's 10-year ASCVD risk score.  The score was 15.2%.  I started the patient on Crestor at her last visit.  We will be repeating her lipid level in 4 months to see the effect the Crestor has on this elevated lipid risk and diabetes.     Patient is interested in getting the Novavax COVID booster and her flu shot.  She wants to wait until the end of October in order to optimize coverage.  Her daughter is a pharmacist at [facility] in [place] and she will be getting these vaccines at her pharmacy.

## 2024-09-25 NOTE — ASSESSMENT & PLAN NOTE
Patient continues to have decreased sensation at the second metatarsal head of both feet.  She carefully monitors the appearance of her feet because of this decree sensation.  Lab Results   Component Value Date    HGBA1C 6.9 07/18/2024       Orders:    IRIS Diabetic eye exam  Patient has not had a diabetic iris exam in the past and had one at the office today.

## 2024-10-05 DIAGNOSIS — I26.09 ACUTE PULMONARY EMBOLISM WITH ACUTE COR PULMONALE, UNSPECIFIED PULMONARY EMBOLISM TYPE (HCC): ICD-10-CM

## 2024-10-07 RX ORDER — APIXABAN 5 MG/1
5 TABLET, FILM COATED ORAL 2 TIMES DAILY
Qty: 180 TABLET | Refills: 3 | Status: SHIPPED | OUTPATIENT
Start: 2024-10-07

## 2024-10-09 ENCOUNTER — TELEPHONE (OUTPATIENT)
Dept: CARDIOLOGY CLINIC | Facility: CLINIC | Age: 64
End: 2024-10-09

## 2024-10-29 ENCOUNTER — OFFICE VISIT (OUTPATIENT)
Dept: PULMONOLOGY | Facility: CLINIC | Age: 64
End: 2024-10-29

## 2024-10-29 VITALS
OXYGEN SATURATION: 98 % | SYSTOLIC BLOOD PRESSURE: 130 MMHG | WEIGHT: 251 LBS | DIASTOLIC BLOOD PRESSURE: 76 MMHG | HEART RATE: 87 BPM | BODY MASS INDEX: 40.34 KG/M2 | TEMPERATURE: 98.1 F | HEIGHT: 66 IN

## 2024-10-29 DIAGNOSIS — I26.09 ACUTE PULMONARY EMBOLISM WITH ACUTE COR PULMONALE, UNSPECIFIED PULMONARY EMBOLISM TYPE (HCC): ICD-10-CM

## 2024-10-29 DIAGNOSIS — Z86.711 HISTORY OF PULMONARY EMBOLUS (PE): Primary | ICD-10-CM

## 2024-10-29 PROBLEM — R93.89 ABNORMAL CT SCAN: Status: RESOLVED | Noted: 2022-07-10 | Resolved: 2024-10-29

## 2024-10-29 NOTE — ASSESSMENT & PLAN NOTE
She is now on Ozempic after having a higher A1C - now enjoying improved lab values. Has not had significant weight loss at this time.    I have encouraged her to pursue moderate exercise.

## 2024-10-29 NOTE — ASSESSMENT & PLAN NOTE
July 2022 segmental/subsegmental pulmonary embolism of the RLL with associated peripheral infarct. She has subsequently tested positive for Factor V and her family has pursued recommended testing as well.    Given the risks of Factor V, sedentary lifestyle, obesity - I am continuing to advise lifelong anticoagulation.  She is tolerating Eliquis well.  Continue 5mg AM and PM daily

## 2024-10-29 NOTE — PROGRESS NOTES
Pulmonary Follow-Up Note   Shakira Sánchez 64 y.o. female MRN: [id number]  10/29/2024      Assessment/Plan:    Problem List Items Addressed This Visit       BMI 40.0-44.9, adult (HCC)     She is now on Ozempic after having a higher A1C - now enjoying improved lab values. Has not had significant weight loss at this time.    I have encouraged her to pursue moderate exercise.         History of pulmonary embolus (PE) - Primary     July 2022 segmental/subsegmental pulmonary embolism of the RLL with associated peripheral infarct. She has subsequently tested positive for Factor V and her family has pursued recommended testing as well.    Given the risks of Factor V, sedentary lifestyle, obesity - I am continuing to advise lifelong anticoagulation.  She is tolerating Eliquis well.  Continue 5mg AM and PM daily          Other Visit Diagnoses       Acute pulmonary embolism with acute cor pulmonale, unspecified pulmonary embolism type (HCC)        Relevant Medications    apixaban (Eliquis) 5 mg          Vaccines: up to date    Return in about 1 year (around 10/29/2025).    All of Shakira's questions were answered prior to leaving the office today.  She is aware to call our office with any further questions or concerns.    History of Present Illness   Reason for Visit: Follow up  Chief Complaint: none  HPI: Shakira Sánchez is a 64 y.o. female who presents to the office today for routine follow up. She has a prior history of pulmonary embolus 7/2022, segmental/subsegmental pulmonary embolism at RLL with associated peripheral infarct, felt to be provoked by long periods of immobilization. Subsequent labs did reveal Factor V Leiden and she was advised to continue lifelong anticoagulation.    Today she feels she is doing well. Recently had venous duplex because she had lingering posterior thigh pain after having a danita horse. Other than that she has no concerns or pulmonary symptoms. She has not been exercising.    Review of Systems  Please  note that a 14-point review of systems was performed to include Constitutional, HEENT, Respiratory, CVS, GI, , Musculoskeletal, Integumentary, Neurologic, Rheumatologic, Endocrinologic, Psychiatric, Lymphatic, and Hematologic/Oncologic systems were reviewed and are negative unless otherwise stated in HPI. Positive and negative findings pertinent to this evaluation are incorporated into the history of present illness.       Historical Information   Past Medical History:   Diagnosis Date    Diabetes mellitus (HCC)     Hyperlipidemia     Hypothyroidism hashimoto     Obesity     Osteoarthritis     Osteoporosis     Spondylosis      Past Surgical History:   Procedure Laterality Date    APPENDECTOMY      CATARACT EXTRACTION, BILATERAL Bilateral 2009 L 1/15/09 R     SECTION      CHOLECYSTECTOMY      CYST REMOVAL  06/15/2012    CYSTOSTOMY W/ BLADDER BIOPSY  06/15/2016    DILATION AND CURETTAGE OF UTERUS      ELBOW SURGERY  2000    HERNIA REPAIR      LYMPH NODE BIOPSY      TOE SURGERY      TOE SURGERY  2005    TONSILLECTOMY      TUBAL LIGATION      UPPER LEG SOFT TISSUE BIOPSY  2013     Family History   Problem Relation Age of Onset    Colon cancer Mother     Prostate cancer Father     Pulmonary embolism Cousin      Social History   Social History     Substance and Sexual Activity   Alcohol Use Never     Social History     Substance and Sexual Activity   Drug Use Never     Social History     Tobacco Use   Smoking Status Never   Smokeless Tobacco Never     E-Cigarette/Vaping    E-Cigarette Use Never User      E-Cigarette/Vaping Substances    Nicotine No     THC No     CBD No     Flavoring No     Other No     Unknown No        Meds/Allergies     Current Outpatient Medications:     apixaban (Eliquis) 5 mg, Take 1 tablet (5 mg total) by mouth 2 (two) times a day, Disp: 180 tablet, Rfl: 3    glucose blood test strip, Start: 24 9:05:00 AM EDT, ONE TOUCH VERIO TEST STRIP, See  "Instructions, Disp# 100 strip, Refills: 2, TEST 1 TIME A DAY, Pharmacy [id number], Disp: , Rfl:     levothyroxine 125 mcg tablet, Take 125 mcg by mouth daily, Disp: , Rfl:     semaglutide, 0.25 or 0.5 mg/dose, (Ozempic, 0.25 or 0.5 MG/DOSE,) 2 mg/3 mL injection pen, Start: 08/28/23 12:50:00 EDT, 3 mL, INJECT 0.5 MG SUBCUTANEOUSLY WEEKLY, Supply, Disp: , Rfl:     rosuvastatin (CRESTOR) 5 mg tablet, Take 1 tablet (5 mg total) by mouth daily (Patient not taking: Reported on 10/29/2024), Disp: 100 tablet, Rfl: 3  Allergies   Allergen Reactions    Codeine GI Intolerance    Liraglutide Diarrhea     MADE MY HEAD \"FUZZY\"  COULD NOT FOCUS    Pollen Extract Allergic Rhinitis    Statins Myalgia       Vitals: Blood pressure 130/76, pulse 87, temperature 98.1 °F (36.7 °C), height 5' 6\" (1.676 m), weight 114 kg (251 lb), SpO2 98%. Body mass index is 40.51 kg/m². Oxygen Therapy  SpO2: 98 %      Physical Exam  Vitals reviewed.   Constitutional:       Appearance: Normal appearance. She is obese.   HENT:      Head: Normocephalic.      Nose: Nose normal.      Mouth/Throat:      Mouth: Mucous membranes are moist.      Pharynx: Oropharynx is clear.   Cardiovascular:      Rate and Rhythm: Normal rate and regular rhythm.      Pulses: Normal pulses.      Heart sounds: Normal heart sounds.   Pulmonary:      Effort: Pulmonary effort is normal.      Breath sounds: Normal breath sounds.   Musculoskeletal:         General: Normal range of motion.   Skin:     General: Skin is warm.      Capillary Refill: Capillary refill takes less than 2 seconds.   Neurological:      General: No focal deficit present.      Mental Status: She is alert and oriented to person, place, and time.   Psychiatric:         Mood and Affect: Mood normal.         Behavior: Behavior normal.         Imaging and other studies: Results Review Statement: I reviewed radiology reports from this admission including: Ultrasound(s).  Venous duplex 9/9/24  Impression:  RIGHT " "LOWER LIMB LIMITED:  Evaluation shows no evidence of thrombus in the common femoral vein.  Doppler evaluation shows a normal response to augmentation maneuvers.     LEFT LOWER LIMB:  No evidence of acute or chronic deep vein thrombosis  No evidence of superficial thrombophlebitis noted.  Doppler evaluation shows a normal response to augmentation maneuvers.  Popliteal, posterior tibial and anterior tibial arterial Doppler waveforms are  triphasic.      JAZZY Mejia  [facility]        Portions of the record may have been created with voice recognition software.  Occasional wrong word or \"sound a like\" substitutions may have occurred due to the inherent limitations of voice recognition software.  Read the chart carefully and recognize, using context, where substitutions have occurred or contact the dictating provider.  "

## 2024-12-11 ENCOUNTER — OFFICE VISIT (OUTPATIENT)
Dept: UROLOGY | Facility: CLINIC | Age: 64
End: 2024-12-11
Payer: MEDICARE

## 2024-12-11 VITALS
OXYGEN SATURATION: 97 % | TEMPERATURE: 97.7 F | HEART RATE: 102 BPM | WEIGHT: 257 LBS | HEIGHT: 66 IN | SYSTOLIC BLOOD PRESSURE: 144 MMHG | DIASTOLIC BLOOD PRESSURE: 82 MMHG | BODY MASS INDEX: 41.3 KG/M2

## 2024-12-11 DIAGNOSIS — R31.29 OTHER MICROSCOPIC HEMATURIA: Primary | ICD-10-CM

## 2024-12-11 PROCEDURE — 99213 OFFICE O/P EST LOW 20 MIN: CPT | Performed by: UROLOGY

## 2024-12-11 PROCEDURE — 81003 URINALYSIS AUTO W/O SCOPE: CPT | Performed by: UROLOGY

## 2024-12-11 NOTE — PROGRESS NOTES
UROLOGY PROGRESS NOTE         NAME: Shakira Sánchez  AGE: 64 y.o. SEX: female  : 1960   MRN: [id number]    DATE: 2024  TIME: 3:00 PM    Assessment and Plan      Impression:   1. Other microscopic hematuria  -     POCT urine dip auto non-scope       Plan: Patient is doing well.  She has had no UTIs or other urologic symptoms over the past year.  Urinalysis today was satisfactory.  Nothing additional indicated at this point.  I will see her back on an as-needed basis.  She will call if any issues.      Chief Complaint     Chief Complaint   Patient presents with   • Follow-up     Yearly check up for microhematuria. Patient stated that she has not had a UTI this year.      History of Present Illness     HPI: Shakira Sánchez is a 64 y.o. year old female who presents with history of microhematuria and previous UTIs.  Here for annual follow-up.  Previously worked up.              The following portions of the patient's history were reviewed and updated as appropriate: allergies, current medications, past family history, past medical history, past social history, past surgical history and problem list.  Past Medical History:   Diagnosis Date   • Diabetes mellitus (HCC)    • Hyperlipidemia    • Hypothyroidism hashimoto    • Obesity    • Osteoarthritis    • Osteoporosis    • Spondylosis      Past Surgical History:   Procedure Laterality Date   • APPENDECTOMY     • CATARACT EXTRACTION, BILATERAL Bilateral 2009 L 1/15/09 R   •  SECTION     • CHOLECYSTECTOMY     • CYST REMOVAL  06/15/2012   • CYSTOSTOMY W/ BLADDER BIOPSY  06/15/2016   • DILATION AND CURETTAGE OF UTERUS     • ELBOW SURGERY  2000   • HERNIA REPAIR     • LYMPH NODE BIOPSY     • TOE SURGERY     • TOE SURGERY  2005   • TONSILLECTOMY     • TUBAL LIGATION     • UPPER LEG SOFT TISSUE BIOPSY  2013     shoulder  Review of Systems     Const: Denies chills, fever and weight loss.  CV: Denies chest pain.  Resp: Denies  "SOB.  GI: Denies abdominal pain, nausea and vomiting.  : Denies symptoms other than stated above.  Musculo: Denies back pain.    Objective   /82   Pulse 102   Temp 97.7 °F (36.5 °C)   Ht 5' 6\" (1.676 m)   Wt 117 kg (257 lb)   SpO2 97%   BMI 41.48 kg/m²     Physical Exam  Const: Appears healthy and well developed. No signs of acute distress present.  Resp: Respirations are regular and unlabored.   CV: Rate is regular. Rhythm is regular.  Abdomen: Abdomen is soft, nontender, and nondistended. Kidneys are not palpable.  : Not performed  Psych: Patient's attitude is cooperative. Mood is normal. Affect is normal.    Procedure   Procedures     Current Medications     Current Outpatient Medications:   •  apixaban (Eliquis) 5 mg, Take 1 tablet (5 mg total) by mouth 2 (two) times a day, Disp: 180 tablet, Rfl: 3  •  glucose blood test strip, Start: 6/18/24 9:05:00 AM EDT, ONE TOUCH VERIO TEST STRIP, See Instructions, Disp# 100 strip, Refills: 2, TEST 1 TIME A DAY, Pharmacy [id number], Disp: , Rfl:   •  levothyroxine 125 mcg tablet, Take 125 mcg by mouth daily, Disp: , Rfl:   •  semaglutide, 0.25 or 0.5 mg/dose, (Ozempic, 0.25 or 0.5 MG/DOSE,) 2 mg/3 mL injection pen, Start: 08/28/23 12:50:00 EDT, 3 mL, INJECT 0.5 MG SUBCUTANEOUSLY WEEKLY, Supply, Disp: , Rfl:         Des Sellers MD          "

## 2025-01-23 ENCOUNTER — APPOINTMENT (OUTPATIENT)
Dept: LAB | Facility: CLINIC | Age: 65
End: 2025-01-23
Payer: MEDICARE

## 2025-01-23 DIAGNOSIS — E11.9 DM TYPE 2, GOAL HBA1C 7%-8% (HCC): ICD-10-CM

## 2025-01-23 DIAGNOSIS — E78.2 MIXED HYPERLIPIDEMIA: ICD-10-CM

## 2025-01-23 LAB
ANION GAP SERPL CALCULATED.3IONS-SCNC: 8 MMOL/L (ref 4–13)
BUN SERPL-MCNC: 19 MG/DL (ref 5–25)
CALCIUM SERPL-MCNC: 9 MG/DL (ref 8.4–10.2)
CHLORIDE SERPL-SCNC: 105 MMOL/L (ref 96–108)
CHOLEST SERPL-MCNC: 208 MG/DL (ref ?–200)
CO2 SERPL-SCNC: 26 MMOL/L (ref 21–32)
CREAT SERPL-MCNC: 0.68 MG/DL (ref 0.6–1.3)
CREAT UR-MCNC: 150.1 MG/DL
EST. AVERAGE GLUCOSE BLD GHB EST-MCNC: 154 MG/DL
GFR SERPL CREATININE-BSD FRML MDRD: 91 ML/MIN/1.73SQ M
GLUCOSE P FAST SERPL-MCNC: 137 MG/DL (ref 65–99)
HBA1C MFR BLD: 7 %
HDLC SERPL-MCNC: 48 MG/DL
LDLC SERPL CALC-MCNC: 126 MG/DL (ref 0–100)
MICROALBUMIN UR-MCNC: <7 MG/L
POTASSIUM SERPL-SCNC: 4.3 MMOL/L (ref 3.5–5.3)
SODIUM SERPL-SCNC: 139 MMOL/L (ref 135–147)
TRIGL SERPL-MCNC: 170 MG/DL (ref ?–150)

## 2025-01-23 PROCEDURE — 80048 BASIC METABOLIC PNL TOTAL CA: CPT

## 2025-01-23 PROCEDURE — 82043 UR ALBUMIN QUANTITATIVE: CPT

## 2025-01-23 PROCEDURE — 83036 HEMOGLOBIN GLYCOSYLATED A1C: CPT

## 2025-01-23 PROCEDURE — 36415 COLL VENOUS BLD VENIPUNCTURE: CPT

## 2025-01-23 PROCEDURE — 82570 ASSAY OF URINE CREATININE: CPT

## 2025-01-23 PROCEDURE — 80061 LIPID PANEL: CPT

## 2025-01-27 ENCOUNTER — OFFICE VISIT (OUTPATIENT)
Dept: FAMILY MEDICINE CLINIC | Facility: CLINIC | Age: 65
End: 2025-01-27
Payer: MEDICARE

## 2025-01-27 ENCOUNTER — TELEPHONE (OUTPATIENT)
Dept: FAMILY MEDICINE CLINIC | Facility: CLINIC | Age: 65
End: 2025-01-27

## 2025-01-27 VITALS
HEART RATE: 88 BPM | TEMPERATURE: 96.9 F | DIASTOLIC BLOOD PRESSURE: 73 MMHG | OXYGEN SATURATION: 99 % | WEIGHT: 257.94 LBS | SYSTOLIC BLOOD PRESSURE: 137 MMHG | HEIGHT: 66 IN | BODY MASS INDEX: 41.45 KG/M2

## 2025-01-27 DIAGNOSIS — D68.51 FACTOR V LEIDEN MUTATION (HCC): ICD-10-CM

## 2025-01-27 DIAGNOSIS — E78.2 MIXED HYPERLIPIDEMIA: ICD-10-CM

## 2025-01-27 DIAGNOSIS — E08.41 DIABETIC MONONEUROPATHY ASSOCIATED WITH DIABETES MELLITUS DUE TO UNDERLYING CONDITION (HCC): Primary | ICD-10-CM

## 2025-01-27 DIAGNOSIS — Z78.9 STATIN INTOLERANCE: ICD-10-CM

## 2025-01-27 DIAGNOSIS — K59.1 FUNCTIONAL DIARRHEA: ICD-10-CM

## 2025-01-27 DIAGNOSIS — E03.9 ACQUIRED HYPOTHYROIDISM: ICD-10-CM

## 2025-01-27 PROCEDURE — 99214 OFFICE O/P EST MOD 30 MIN: CPT | Performed by: PHYSICIAN ASSISTANT

## 2025-01-27 PROCEDURE — G2211 COMPLEX E/M VISIT ADD ON: HCPCS | Performed by: PHYSICIAN ASSISTANT

## 2025-01-27 RX ORDER — CHOLESTYRAMINE LIGHT 4 G/5.7G
4 POWDER, FOR SUSPENSION ORAL 2 TIMES DAILY
Qty: 60 PACKET | Refills: 3 | Status: SHIPPED | OUTPATIENT
Start: 2025-01-27

## 2025-01-27 RX ORDER — LEVOTHYROXINE SODIUM 125 UG/1
125 TABLET ORAL DAILY
Qty: 90 TABLET | Refills: 3 | Status: SHIPPED | OUTPATIENT
Start: 2025-01-27 | End: 2025-02-04 | Stop reason: CLARIF

## 2025-01-27 NOTE — PROGRESS NOTES
Name: Shakira Sánchez      : 1960      MRN: [id number]  Encounter Provider: Rubi Lheman PA-C  Encounter Date: 2025   Encounter department: [facility] [address] PRIMARY [facility]  :  Assessment & Plan  Diabetic mononeuropathy associated with diabetes mellitus due to underlying condition (HCC)  Diabetes very well-controlled with 0.25 mg Ozempic.  Will recheck in 6 months.  Refilled the Ozempic.  Lab Results   Component Value Date    HGBA1C 7.0 (H) 2025       Orders:  •  semaglutide, 0.25 or 0.5 mg/dose, (Ozempic, 0.25 or 0.5 MG/DOSE,) 2 mg/3 mL injection pen; Inject 0.375 mL (0.25 mg total) under the skin every 7 days  •  Albumin / creatinine urine ratio; Future  •  Basic metabolic panel; Future  •  Hemoglobin A1C; Future  •  Lipid Panel with Direct LDL reflex; Future    BMI 40.0-44.9, adult ([facility])  Patient's weight remains stable.  Admits that she does not have time for exercise.  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  Encouraged increased activity and improve diet.       Functional diarrhea  Patient having diarrhea after almost every meal.  Sometimes she cannot make it to the bathroom in time.  Has responded to cholestyramine in the past.  Gave specific instructions about the proper way to take this medication surrounding her other medications.  Ordered the Questran Light being diabetic.  Orders:  •  cholestyramine sugar free (QUESTRAN LIGHT) 4 g packet; Take 1 packet (4 g total) by mouth 2 (two) times a day    Acquired hypothyroidism  Patient needs the tradename for Levoxyl.  Tradename ordered.  Orders:  •  levothyroxine 125 mcg tablet; Take 1 tablet (125 mcg total) by mouth daily    Factor V Leiden mutation ([facility])  Previous pulmonary embolism.  Positive factor V Leiden.  On Eliquis long-term basis prescribed by pulmonary.       Statin intolerance  Patient has tried various statins and gets significant myalgias and unable to tolerate.  ASCVD score 15.4%.  Will send  "to lipid clinic for PCSK9 inhibitor therapy.  Patient agreeing to accept this treatment.  Orders:  •  Ambulatory Referral to Cardiology; Future    Mixed hyperlipidemia  15.4% 10-year ASCVD risk.  Is being sent to lipid clinic for Repatha therapy.  Orders:  •  Ambulatory Referral to Cardiology; Future           History of Present Illness   HPI: 64-year-old female well-known to me sees me for her primary care services.  Diabetic control is excellent with a hemoglobin A1c 7%.  Sees podiatry every 9 weeks for nail trimming.  Will need repeat foot exam when I see her for her yearly exam in September.    Went over her labs.  He is willing to go to lipid clinic as she cannot tolerate statins.  Not having pain in her chest heart palpitations dizziness lightheadedness syncope or near syncope.  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  Recommended that she plan exercise on an ongoing basis.  Review of Systems: No recent URI or viral syndrome.  Offered COVID shot and declined.  Has gotten her flu shot previously.    Objective   /73 (BP Location: Right arm, Patient Position: Sitting, Cuff Size: Large)   Pulse 88   Temp (!) 96.9 °F (36.1 °C) (Tympanic)   Ht 5' 6\" (1.676 m)   Wt 117 kg (257 lb 15 oz)   SpO2 99%   BMI 41.63 kg/m²      Physical Exam: Reviewed vital signs.  She is normotensive and afebrile.    Heart is regular rate without murmur rub or gallops.  Lungs are clear to auscultation.  Administrative Statements   I have spent a total time of 35 minutes in caring for this patient on the day of the visit/encounter including Diagnostic results, Prognosis, Risks and benefits of tx options, Instructions for management, Patient and family education, Importance of tx compliance, Risk factor reductions, Impressions, Counseling / Coordination of care, Documenting in the medical record, Reviewing / ordering tests, medicine, procedures  , and Obtaining or reviewing " history  .    I will see her in 6 months for her diabetic follow-up.  She will get hemoglobin A1c, BMP, and urine for micro albumin prior to the visit.  She is willing to get to the lipid clinic.

## 2025-01-27 NOTE — TELEPHONE ENCOUNTER
Please start Prior Auth for:    Ozempic (0.25 or 0.5MG/Dose) 2mg/3ml Pen-injectors     Key: [id number]

## 2025-01-27 NOTE — ASSESSMENT & PLAN NOTE
Diabetes very well-controlled with 0.25 mg Ozempic.  Will recheck in 6 months.  Refilled the Ozempic.  Lab Results   Component Value Date    HGBA1C 7.0 (H) 01/23/2025       Orders:  •  semaglutide, 0.25 or 0.5 mg/dose, (Ozempic, 0.25 or 0.5 MG/DOSE,) 2 mg/3 mL injection pen; Inject 0.375 mL (0.25 mg total) under the skin every 7 days  •  Albumin / creatinine urine ratio; Future  •  Basic metabolic panel; Future  •  Hemoglobin A1C; Future  •  Lipid Panel with Direct LDL reflex; Future

## 2025-01-27 NOTE — ASSESSMENT & PLAN NOTE
Patient's weight remains stable.  Admits that she does not have time for exercise.  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  Encouraged increased activity and improve diet.

## 2025-01-27 NOTE — ASSESSMENT & PLAN NOTE
Patient needs the tradename for Levoxyl.  Tradename ordered.  Orders:  •  levothyroxine 125 mcg tablet; Take 1 tablet (125 mcg total) by mouth daily

## 2025-01-27 NOTE — ASSESSMENT & PLAN NOTE
Previous pulmonary embolism.  Positive factor V Leiden.  On Eliquis long-term basis prescribed by pulmonary.

## 2025-01-27 NOTE — ASSESSMENT & PLAN NOTE
15.4% 10-year ASCVD risk.  Is being sent to lipid clinic for Repatha therapy.  Orders:  •  Ambulatory Referral to Cardiology; Future

## 2025-01-29 ENCOUNTER — TELEPHONE (OUTPATIENT)
Age: 65
End: 2025-01-29

## 2025-01-29 DIAGNOSIS — E03.9 ACQUIRED HYPOTHYROIDISM: Primary | ICD-10-CM

## 2025-01-29 RX ORDER — LEVOTHYROXINE SODIUM 137 UG/1
137 TABLET ORAL DAILY
Qty: 90 TABLET | Refills: 3 | Status: SHIPPED | OUTPATIENT
Start: 2025-01-29 | End: 2025-02-04 | Stop reason: CLARIF

## 2025-01-29 NOTE — TELEPHONE ENCOUNTER
PA for OZEMPIC SUBMITTED to [employer]     via    []CMM-KEY:     [x]Surescripts-Case ID #       [id number]     []Availity-Auth ID #  NDC #     []Faxed to plan   []Other website    []Phone call Case ID #       [x]PA sent as URGENT    All office notes, labs and other pertaining documents and studies sent. Clinical questions answered. Awaiting determination from insurance company.     Turnaround time for your insurance to make a decision on your Prior Authorization can take 7-21 business days.

## 2025-01-29 NOTE — TELEPHONE ENCOUNTER
Patient called and says that when her scripts were all renewed on 1/27, LEVOTHYROXINE was sent to the pharmacy by mistake.     She needs LEVOXYL, brand necessary.    Please send corrected script to [facility] in [place].

## 2025-01-31 NOTE — TELEPHONE ENCOUNTER
PA for OZEMPIC  APPROVED     Date(s) approved  January 1, 2025 to January 29, 2026     Case #[id number]     Patient advised by          [][employer] Message  []Phone call   []LMOM  []L/M to call office as no active Communication consent on file  []Unable to leave detailed message as VM not approved on Communication consent       Pharmacy advised by    []Fax  []Phone call    Approval letter scanned into [employer] see below

## 2025-02-04 DIAGNOSIS — E03.9 ACQUIRED HYPOTHYROIDISM: ICD-10-CM

## 2025-02-04 RX ORDER — LEVOTHYROXINE SODIUM 125 UG/1
125 TABLET ORAL DAILY
Qty: 90 TABLET | Refills: 3 | Status: SHIPPED | OUTPATIENT
Start: 2025-02-04

## 2025-02-04 NOTE — TELEPHONE ENCOUNTER
Patient called the RX Refill Line. Message is being forwarded to the office.     Patient state she called last week that levothyroxine was sent to the pharmacy but she needed levoxyl and levoxyl brand necessary was sent to [facility] but for the incorrect dosage.    Patient state Levoxyl was sent to [facility] as 137mcg and it should be 125 mcg,     Patient is requesting that Levoxyl be changed and sent to [facility] as Levoxyl (brand necessary) 125 mcg daily .    Pt also requested for levothyroxine 125 mcg and levoxyl 137 mcg that was sent to [facility] 01.2025 canceled.    Please contact patient at [phone] with any questions or concerns

## 2025-02-04 NOTE — TELEPHONE ENCOUNTER
Spoke to patient in regards to medication change and let her know per provider request everything has been done that she asked

## 2025-03-18 ENCOUNTER — CONSULT (OUTPATIENT)
Dept: CARDIOLOGY CLINIC | Facility: CLINIC | Age: 65
End: 2025-03-18
Payer: MEDICARE

## 2025-03-18 VITALS
HEART RATE: 90 BPM | HEIGHT: 66 IN | SYSTOLIC BLOOD PRESSURE: 132 MMHG | BODY MASS INDEX: 41.62 KG/M2 | OXYGEN SATURATION: 98 % | TEMPERATURE: 98.4 F | DIASTOLIC BLOOD PRESSURE: 90 MMHG | WEIGHT: 259 LBS

## 2025-03-18 DIAGNOSIS — Z86.711 HISTORY OF PULMONARY EMBOLUS (PE): ICD-10-CM

## 2025-03-18 DIAGNOSIS — E78.2 MIXED HYPERLIPIDEMIA: Primary | ICD-10-CM

## 2025-03-18 DIAGNOSIS — D68.51 FACTOR V LEIDEN MUTATION (HCC): ICD-10-CM

## 2025-03-18 DIAGNOSIS — Z78.9 STATIN INTOLERANCE: ICD-10-CM

## 2025-03-18 DIAGNOSIS — E11.9 TYPE 2 DIABETES MELLITUS WITHOUT COMPLICATION, WITHOUT LONG-TERM CURRENT USE OF INSULIN (HCC): ICD-10-CM

## 2025-03-18 PROCEDURE — 93000 ELECTROCARDIOGRAM COMPLETE: CPT | Performed by: INTERNAL MEDICINE

## 2025-03-18 PROCEDURE — 99204 OFFICE O/P NEW MOD 45 MIN: CPT | Performed by: INTERNAL MEDICINE

## 2025-03-18 NOTE — PROGRESS NOTES
[facility] [place]  [address]  [address]  [facility] [address] [phone]      Patient name: Shakira Sánchez   YOB: 1960   MR no: [id number]        Diagnosis ICD-10-CM Associated Orders   1. Mixed hyperlipidemia  E78.2 Ambulatory Referral to Cardiology     CT coronary calcium score      2. Statin intolerance  Z78.9 Ambulatory Referral to Cardiology      3. Type 2 diabetes mellitus without complication, without long-term current use of insulin ([facility])  E11.9       4. Factor V Leiden mutation ([facility])  D68.51       5. History of pulmonary embolus (PE)  Z86.711 POCT ECG      6. BMI 40.0-44.9, adult ([facility])  Z68.41            Assessment and Recommendations:    1. Mixed hyperlipidemia  -     Ambulatory Referral to Cardiology  -     CT coronary calcium score; Future; Expected date: 03/18/2025  2. Statin intolerance  -     Ambulatory Referral to Cardiology  3. Type 2 diabetes mellitus without complication, without long-term current use of insulin ([facility])  4. Factor V Leiden mutation ([facility])  5. History of pulmonary embolus (PE)  -     POCT ECG  6. BMI 40.0-44.9, adult ([facility])     Most recent lipid profile in January 2025 showed mildly elevated LDL and triglycerides.  Even though her 10-year ASCVD risk is high, her LDL is not significantly elevated.  I would recommend a coronary artery calcium score and we will determine whether she needs lipid-lowering therapy or not.  If her calcium score is 0, no indication to recommend lipid-lowering therapy.  If her calcium score is high, we may consider Nexletol as a option.  She states that she did try ezetimibe in the past but thinks that she had leg pain with that.  However that was the time when she was diagnosed with the PE as well and so it is not clear whether ezetimibe really caused any symptoms.  Patient is on lifelong oral anticoagulation due to her hypercoagulable state and history of PE.  Echocardiogram from 2022  reported normal left ventricular systolic function with no significant valvular pathology and normal right heart function.  Patient did not have much success with weight loss on her Ozempic.  Her glycemic control is quite good.  She needs major lifestyle modification with diet and increase physical activity for weight loss.  I will be in touch with the patient once have reviewed her coronary artery calcium score.  Return to cardiology as needed.    CHIEF COMPLAINT:  Hyperlipidemia    HPI:  65-year-old female with past medical history significant for mild mixed hyperlipidemia, statin intolerance, hypertension, type 2 diabetes mellitus, factor V Leiden mutation with right lower lobe PE  (diagnosed in 2022, likely provoked by prolonged periods of immobilization. CTPA showed segmental/subsegmental pulmonary embolism at the RLL with associated peripheral infarct patient was treated with heparin and placed on long-term oral anticoagulation), presents for first visit to cardiology for advice regarding nonstatin therapy.  Patient works as a  and mostly is sedentary and admits to not doing any regular exercise but denies any chest pain, dyspnea, palpitations or syncope.    Past Medical History:   Diagnosis Date    Diabetes mellitus (HCC)     Hyperlipidemia     Hypothyroidism hashimoto     Obesity     Osteoarthritis     Osteoporosis     Spondylosis         Past Surgical History:   Procedure Laterality Date    APPENDECTOMY      CATARACT EXTRACTION, BILATERAL Bilateral 2009 L 1/15/09 R     SECTION      CHOLECYSTECTOMY      CYST REMOVAL  06/15/2012    CYSTOSTOMY W/ BLADDER BIOPSY  06/15/2016    DILATION AND CURETTAGE OF UTERUS      ELBOW SURGERY  2000    HERNIA REPAIR      LYMPH NODE BIOPSY      TOE SURGERY      TOE SURGERY  2005    TONSILLECTOMY      TUBAL LIGATION      UPPER LEG SOFT TISSUE BIOPSY  2013        Social History     Tobacco Use    Smoking status: Never     "Smokeless tobacco: Never   Vaping Use    Vaping status: Never Used   Substance Use Topics    Alcohol use: Never    Drug use: Never       Family History   Problem Relation Age of Onset    Colon cancer Mother     Prostate cancer Father     Pulmonary embolism Cousin    No premature CAD in parents or siblings.     Allergies   Allergen Reactions    Codeine GI Intolerance    Liraglutide Diarrhea     MADE MY HEAD \"FUZZY\"  COULD NOT FOCUS    Pollen Extract Allergic Rhinitis    Statins Myalgia         Current Outpatient Medications:     apixaban (Eliquis) 5 mg, Take 1 tablet (5 mg total) by mouth 2 (two) times a day, Disp: 180 tablet, Rfl: 3    cholestyramine sugar free (QUESTRAN LIGHT) 4 g packet, Take 1 packet (4 g total) by mouth 2 (two) times a day, Disp: 60 packet, Rfl: 3    glucose blood test strip, Start: 6/18/24 9:05:00 AM EDT, ONE TOUCH VERIO TEST STRIP, See Instructions, Disp# 100 strip, Refills: 2, TEST 1 TIME A DAY, Pharmacy [id number], Disp: , Rfl:     Levoxyl 125 MCG tablet, Take 1 tablet (125 mcg total) by mouth daily, Disp: 90 tablet, Rfl: 3    semaglutide, 0.25 or 0.5 mg/dose, (Ozempic, 0.25 or 0.5 MG/DOSE,) 2 mg/3 mL injection pen, Inject 0.375 mL (0.25 mg total) under the skin every 7 days, Disp: 3 mL, Rfl: 2     Lab Results   Component Value Date    CREATININE 0.68 01/23/2025    CREATININE 0.80 12/05/2022    CREATININE 0.81 07/29/2022    K 4.3 01/23/2025    K 4.4 12/05/2022    K 3.5 07/29/2022    SODIUM 139 01/23/2025    SODIUM 137 12/05/2022    SODIUM 136 07/29/2022    LDLCALC 126 (H) 01/23/2025    TRIG 170 (H) 01/23/2025    HDL 48 (L) 01/23/2025    CHOLESTEROL 208 (H) 01/23/2025    TSH 2.33 08/17/2020       I have personally reviewed the ECG from today which showed normal sinus rhythm at 79 beats a minute with nonspecific T wave abnormality.  REVIEW OF SYSTEMS   Positive for: Arthritis  Negative for: All remaining as reviewed below and in HPI.    SYSTEM SYMPTOMS REVIEWED:  General--weight change, " "fever, night sweats  Respiratory--cough, wheezing, shortness of breath, sputum production  Cardiovascular--chest pain, syncope, dyspnea on exertion, edema, decline in exercise tolerance, claudication   Gastrointestinal--persistent vomiting, diarrhea, abdominal distention, blood in stool   Muscular or skeletal--joint pain or swelling   Neurologic--headaches, syncope, abnormal movement  Hematologic--history of easy bruising and bleeding   Endocrine--thyroid enlargement, heat or cold intolerance, polyuria   Psychiatric--anxiety, depression     Vitals:    03/18/25 1320   BP: 132/90   Pulse: 90   Temp: 98.4 °F (36.9 °C)   SpO2: 98%   Weight: 117 kg (259 lb)   Height: 5' 6\" (1.676 m)       Wt Readings from Last 3 Encounters:   03/18/25 117 kg (259 lb)   01/27/25 117 kg (257 lb 15 oz)   12/11/24 117 kg (257 lb)        Body mass index is 41.8 kg/m².     General physical examination:    General appearance: Alert, no acute distress, appears stated age, severe obesity.  HEENT: Mucous membranes are moist.  No obvious abnormality noted.  Neck: Supple with no lymphadenopathy.  No JVD.  Carotid pulses are intact.  No carotid bruit.  Cardiovascular system: Regular rhythm.  Normal S1 and S2.  No murmurs.  No rubs or gallops. Extremities: No edema. No cyanosis.  Pulmonary: Respirations unlabored.  Good air entry bilaterally.  Clear to auscultation bilaterally.  Gastrointestinal: Abdomen is soft and nontender.  Bowel sounds are positive.  Musculoskeletal: Upper Extremities: Normal upper motor strength. Lower Extremity: Normal motor strength. Gait: Normal.   Skin: Skin is warm. No rashes or lesions.  Neurological: Patient is alert and oriented with no gross motor deficits.  Psychiatric: Mood is normal.  Behavior is normal.        Thank you for allowing me to be a part of this patient's care. If you have further questions, please feel free to contact me.    Bernard Tadeo MD, FAC, [facility]    Portions of the record  have been created with " "voice recognition software.  Occasional grammatical mistakes or wrong word or \"sound alike\" substitutions may have occurred due to the inherent limitations of voice recognition software. Please reach out to me directly for any clarifications.     "

## 2025-03-24 ENCOUNTER — HOSPITAL ENCOUNTER (OUTPATIENT)
Dept: CT IMAGING | Facility: HOSPITAL | Age: 65
Discharge: HOME | End: 2025-03-24
Attending: INTERNAL MEDICINE
Payer: COMMERCIAL

## 2025-03-24 DIAGNOSIS — E78.2 MIXED HYPERLIPIDEMIA: ICD-10-CM

## 2025-03-24 PROCEDURE — 75571 CT HRT W/O DYE W/CA TEST: CPT

## 2025-03-28 ENCOUNTER — TELEPHONE (OUTPATIENT)
Age: 65
End: 2025-03-28

## 2025-03-28 NOTE — TELEPHONE ENCOUNTER
Caller: [facility] - [facility]    Doctor: Dr. Tadeo    Reason for call: Cabrera from [facility] called to let Dr. Tadeo know that the CT scan done on 3/24/25 had significant findings.     Call back#: [phone]

## 2025-03-28 NOTE — TELEPHONE ENCOUNTER
Please let Shakira know that her CT showed a calcium score of 0 which indicates a lower risk of having a cardiovascular event in the next 10 years.   Of note, a new very small pulmonary nodule was observed and I recommend she follow up with her PCP to discuss follow up imaging as needed.

## 2025-03-31 NOTE — TELEPHONE ENCOUNTER
Received call back from patient. Informed her of results and recommendations from Angela. She expressed understanding.

## 2025-05-19 ENCOUNTER — TELEPHONE (OUTPATIENT)
Age: 65
End: 2025-05-19

## 2025-05-19 NOTE — TELEPHONE ENCOUNTER
Please let the patient know that the nodule in the upper lobe is stable and has not changed which is a benign finding.  Her other 3 mm nodule is less than 6 mm nodule and is not concerning and a low risk patient like she is.  There is nothing we need to do at this time and these findings do not suggest that we even continue to track these 2 nodules.    Eris

## 2025-05-19 NOTE — TELEPHONE ENCOUNTER
Please let the patient know that her coronary artery calcium score is 0 which puts her at very low risk for coronary disease.  We can have this discussion on 8/4/2025 as no additional therapy is needed at this time.    Eris

## 2025-05-19 NOTE — TELEPHONE ENCOUNTER
Patient called regarding CT coronary calcium score and wanted Rubi to look at report. Was advised to follow up with pcp to discuss follow up imaging. Please kindly call patient back at [phone]. Thank you.

## 2025-05-19 NOTE — TELEPHONE ENCOUNTER
Spoke with Shakira in regards to the CT scan. Patient is concerned about the Nodule that was found on the lower lobe, Patient will like to know if she needs follow up with her PCP or with her Pulmonologist. Please advise!

## 2025-05-20 NOTE — TELEPHONE ENCOUNTER
"Spoke with the patient and relayed the message per Dominik \"Please let the patient know that the nodule in the upper lobe is stable and has not changed which is a benign finding.  Her other 3 mm nodule is less than 6 mm nodule and is not concerning and a low risk patient like she is.  There is nothing we need to do at this time and these findings do not suggest that we even continue to track these 2 nodules.\" Patient understood and disconnected the call.      "

## 2025-06-12 ENCOUNTER — OFFICE VISIT (OUTPATIENT)
Dept: URGENT CARE | Facility: CLINIC | Age: 65
End: 2025-06-12
Payer: MEDICARE

## 2025-06-12 VITALS
HEART RATE: 93 BPM | BODY MASS INDEX: 41.65 KG/M2 | TEMPERATURE: 98.1 F | HEIGHT: 65 IN | WEIGHT: 250 LBS | OXYGEN SATURATION: 98 % | RESPIRATION RATE: 18 BRPM

## 2025-06-12 DIAGNOSIS — R31.9 URINARY TRACT INFECTION WITH HEMATURIA, SITE UNSPECIFIED: Primary | ICD-10-CM

## 2025-06-12 DIAGNOSIS — N39.0 URINARY TRACT INFECTION WITH HEMATURIA, SITE UNSPECIFIED: Primary | ICD-10-CM

## 2025-06-12 DIAGNOSIS — R39.9 UTI SYMPTOMS: ICD-10-CM

## 2025-06-12 LAB
SL AMB  POCT GLUCOSE, UA: NEGATIVE
SL AMB LEUKOCYTE ESTERASE,UA: ABNORMAL
SL AMB POCT BILIRUBIN,UA: NEGATIVE
SL AMB POCT BLOOD,UA: ABNORMAL
SL AMB POCT CLARITY,UA: ABNORMAL
SL AMB POCT COLOR,UA: YELLOW
SL AMB POCT KETONES,UA: NEGATIVE
SL AMB POCT NITRITE,UA: POSITIVE
SL AMB POCT PH,UA: 5
SL AMB POCT SPECIFIC GRAVITY,UA: 1.01
SL AMB POCT URINE PROTEIN: ABNORMAL
SL AMB POCT UROBILINOGEN: 0.2

## 2025-06-12 PROCEDURE — 99203 OFFICE O/P NEW LOW 30 MIN: CPT

## 2025-06-12 PROCEDURE — 87181 SC STD AGAR DILUTION PER AGT: CPT

## 2025-06-12 PROCEDURE — 87186 SC STD MICRODIL/AGAR DIL: CPT

## 2025-06-12 PROCEDURE — 87086 URINE CULTURE/COLONY COUNT: CPT

## 2025-06-12 PROCEDURE — G0463 HOSPITAL OUTPT CLINIC VISIT: HCPCS

## 2025-06-12 PROCEDURE — 87077 CULTURE AEROBIC IDENTIFY: CPT

## 2025-06-12 PROCEDURE — 81002 URINALYSIS NONAUTO W/O SCOPE: CPT

## 2025-06-12 RX ORDER — PHENAZOPYRIDINE HYDROCHLORIDE 100 MG/1
100 TABLET, FILM COATED ORAL 3 TIMES DAILY PRN
Qty: 10 TABLET | Refills: 0 | Status: SHIPPED | OUTPATIENT
Start: 2025-06-12

## 2025-06-12 RX ORDER — SULFAMETHOXAZOLE AND TRIMETHOPRIM 800; 160 MG/1; MG/1
1 TABLET ORAL EVERY 12 HOURS SCHEDULED
Qty: 10 TABLET | Refills: 0 | Status: SHIPPED | OUTPATIENT
Start: 2025-06-12 | End: 2025-06-17

## 2025-06-12 NOTE — PROGRESS NOTES
[facility]        NAME: Shakira Sánchez is a 65 y.o. female  : 1960    MRN: [id number]  DATE: 2025  TIME: 3:03 PM    Assessment and Plan   Urinary tract infection with hematuria, site unspecified [N39.0, R31.9]  1. Urinary tract infection with hematuria, site unspecified  sulfamethoxazole-trimethoprim (BACTRIM DS) 800-160 mg per tablet    phenazopyridine (PYRIDIUM) 100 mg tablet      2. UTI symptoms  POCT urine dip    Urine culture    Urine culture        UA: large leuks, pos nitrites, large blood     Patient Instructions     Take antibiotics as prescribed. Continue entire course even if feeling better. Can take with Probiotic or yogurt with live bacteria, such as activa, to help with GI upset.   Plenty of fluids   Don't hold urine  Follow up with PCP in 3-5 days.  Proceed to  ER if symptoms worsen.     If tests are performed, our office will contact you with results only if changes need to made to the care plan discussed with you at the visit. [facility]    Chief Complaint     Chief Complaint   Patient presents with    Possible UTI     X4 days of after urinating has a lot of [pressure. No frequency, no burning          History of Present Illness       Urinary Tract Infection   This is a new problem. Episode onset: 4 days. There has been no fever. Pertinent negatives include no chills, flank pain, frequency, hematuria, nausea, urgency or vomiting.        Review of Systems   Review of Systems   Constitutional:  Negative for activity change, appetite change, chills and fever.   Respiratory:  Negative for cough, shortness of breath and wheezing.    Cardiovascular:  Negative for chest pain.   Gastrointestinal:  Negative for abdominal pain, constipation, diarrhea, nausea and vomiting.   Genitourinary:  Negative for difficulty urinating, dysuria, flank pain, frequency, hematuria and urgency.        Pressure after urinating   Neurological:  Negative for  "dizziness and light-headedness.         Current Medications     Current Medications[1]    Current Allergies     Allergies as of 06/12/2025 - Reviewed 06/12/2025   Allergen Reaction Noted    Codeine GI Intolerance 02/13/2013    Liraglutide Diarrhea 06/20/2018    Pollen extract Allergic Rhinitis 03/26/2014    Statins Myalgia 09/08/2016            The following portions of the patient's history were reviewed and updated as appropriate: allergies, current medications, past family history, past medical history, past social history, past surgical history and problem list.     Past Medical History[2]    Past Surgical History[3]    Family History[4]      Medications have been verified.        Objective   Pulse 93   Temp 98.1 °F (36.7 °C)   Resp 18   Ht 5' 5\" (1.651 m)   Wt 113 kg (250 lb)   SpO2 98%   BMI 41.60 kg/m²        Physical Exam     Physical Exam  Constitutional:       General: She is not in acute distress.     Appearance: Normal appearance. She is not ill-appearing.   HENT:      Head: Normocephalic.     Cardiovascular:      Rate and Rhythm: Normal rate and regular rhythm.      Pulses: Normal pulses.      Heart sounds: Normal heart sounds.   Pulmonary:      Effort: Pulmonary effort is normal.      Breath sounds: Normal breath sounds.   Abdominal:      General: Abdomen is flat. Bowel sounds are normal. There is no distension.      Palpations: Abdomen is soft.      Tenderness: There is no abdominal tenderness. There is no right CVA tenderness, left CVA tenderness, guarding or rebound.   Lymphadenopathy:      Cervical: No cervical adenopathy.     Skin:     General: Skin is warm and dry.     Neurological:      General: No focal deficit present.      Mental Status: She is alert and oriented to person, place, and time. Mental status is at baseline.     Psychiatric:         Mood and Affect: Mood normal.         Behavior: Behavior normal.         Thought Content: Thought content normal.         Judgment: Judgment " normal.                        [1]   Current Outpatient Medications:     apixaban (Eliquis) 5 mg, Take 1 tablet (5 mg total) by mouth 2 (two) times a day, Disp: 180 tablet, Rfl: 3    cholestyramine sugar free (QUESTRAN LIGHT) 4 g packet, Take 1 packet (4 g total) by mouth 2 (two) times a day, Disp: 60 packet, Rfl: 3    Levoxyl 125 MCG tablet, Take 1 tablet (125 mcg total) by mouth daily, Disp: 90 tablet, Rfl: 3    phenazopyridine (PYRIDIUM) 100 mg tablet, Take 1 tablet (100 mg total) by mouth 3 (three) times a day as needed for bladder spasms, Disp: 10 tablet, Rfl: 0    semaglutide, 0.25 or 0.5 mg/dose, (Ozempic, 0.25 or 0.5 MG/DOSE,) 2 mg/3 mL injection pen, Inject 0.375 mL (0.25 mg total) under the skin every 7 days, Disp: 3 mL, Rfl: 2    sulfamethoxazole-trimethoprim (BACTRIM DS) 800-160 mg per tablet, Take 1 tablet by mouth every 12 (twelve) hours for 5 days, Disp: 10 tablet, Rfl: 0    glucose blood test strip, Start: 24 9:05:00 AM EDT, ONE TOUCH VERIO TEST STRIP, See Instructions, Disp# 100 strip, Refills: 2, TEST 1 TIME A DAY, Pharmacy [id number] (Patient not taking: Reported on 2025), Disp: , Rfl:   [2]   Past Medical History:  Diagnosis Date    Diabetes mellitus (HCC)     Hyperlipidemia     Hypothyroidism hashimoto     Obesity     Osteoarthritis     Osteoporosis     Spondylosis    [3]   Past Surgical History:  Procedure Laterality Date    APPENDECTOMY      CATARACT EXTRACTION, BILATERAL Bilateral 2009 L 1/15/09 R     SECTION      CHOLECYSTECTOMY      CYST REMOVAL  06/15/2012    CYSTOSTOMY W/ BLADDER BIOPSY  06/15/2016    DILATION AND CURETTAGE OF UTERUS      ELBOW SURGERY  2000    HERNIA REPAIR      LYMPH NODE BIOPSY      TOE SURGERY      TOE SURGERY  2005    TONSILLECTOMY      TUBAL LIGATION      UPPER LEG SOFT TISSUE BIOPSY  2013   [4]   Family History  Problem Relation Name Age of Onset    Colon cancer Mother      Prostate cancer Father       Pulmonary embolism Cousin

## 2025-06-15 ENCOUNTER — RESULTS FOLLOW-UP (OUTPATIENT)
Dept: URGENT CARE | Facility: CLINIC | Age: 65
End: 2025-06-15

## 2025-06-15 LAB — BACTERIA UR CULT: ABNORMAL

## 2025-06-17 ENCOUNTER — HOSPITAL ENCOUNTER (OUTPATIENT)
Dept: RADIOLOGY | Facility: CLINIC | Age: 65
Discharge: HOME | End: 2025-06-17
Attending: PHYSICIAN ASSISTANT
Payer: MEDICARE

## 2025-06-17 DIAGNOSIS — Z12.31 ENCOUNTER FOR SCREENING MAMMOGRAM FOR MALIGNANT NEOPLASM OF BREAST: ICD-10-CM

## 2025-06-17 PROCEDURE — 77067 SCR MAMMO BI INCL CAD: CPT

## 2025-06-17 PROCEDURE — 77063 BREAST TOMOSYNTHESIS BI: CPT

## 2025-07-02 DIAGNOSIS — K59.1 FUNCTIONAL DIARRHEA: ICD-10-CM

## 2025-07-02 RX ORDER — CHOLESTYRAMINE LIGHT 4 G/5.7G
POWDER, FOR SUSPENSION ORAL
Qty: 60 EACH | Refills: 3 | Status: SHIPPED | OUTPATIENT
Start: 2025-07-02

## 2025-07-23 DIAGNOSIS — E08.41 DIABETIC MONONEUROPATHY ASSOCIATED WITH DIABETES MELLITUS DUE TO UNDERLYING CONDITION (HCC): ICD-10-CM

## 2025-07-24 ENCOUNTER — APPOINTMENT (OUTPATIENT)
Dept: LAB | Facility: CLINIC | Age: 65
End: 2025-07-24
Payer: MEDICARE

## 2025-07-24 DIAGNOSIS — E08.41 DIABETIC MONONEUROPATHY ASSOCIATED WITH DIABETES MELLITUS DUE TO UNDERLYING CONDITION (HCC): ICD-10-CM

## 2025-07-24 LAB
ANION GAP SERPL CALCULATED.3IONS-SCNC: 9 MMOL/L (ref 4–13)
BUN SERPL-MCNC: 12 MG/DL (ref 5–25)
CALCIUM SERPL-MCNC: 8.9 MG/DL (ref 8.4–10.2)
CHLORIDE SERPL-SCNC: 104 MMOL/L (ref 96–108)
CHOLEST SERPL-MCNC: 193 MG/DL (ref ?–200)
CO2 SERPL-SCNC: 25 MMOL/L (ref 21–32)
CREAT SERPL-MCNC: 0.62 MG/DL (ref 0.6–1.3)
CREAT UR-MCNC: 110.3 MG/DL
EST. AVERAGE GLUCOSE BLD GHB EST-MCNC: 154 MG/DL
GFR SERPL CREATININE-BSD FRML MDRD: 93 ML/MIN/1.73SQ M
GLUCOSE P FAST SERPL-MCNC: 137 MG/DL (ref 65–99)
HBA1C MFR BLD: 7 %
HDLC SERPL-MCNC: 41 MG/DL
LDLC SERPL CALC-MCNC: 123 MG/DL (ref 0–100)
MICROALBUMIN UR-MCNC: <7 MG/L
POTASSIUM SERPL-SCNC: 4 MMOL/L (ref 3.5–5.3)
SODIUM SERPL-SCNC: 138 MMOL/L (ref 135–147)
TRIGL SERPL-MCNC: 147 MG/DL (ref ?–150)

## 2025-07-24 PROCEDURE — 36415 COLL VENOUS BLD VENIPUNCTURE: CPT

## 2025-07-24 PROCEDURE — 80048 BASIC METABOLIC PNL TOTAL CA: CPT

## 2025-07-24 PROCEDURE — 80061 LIPID PANEL: CPT

## 2025-07-24 PROCEDURE — 82043 UR ALBUMIN QUANTITATIVE: CPT

## 2025-07-24 PROCEDURE — 83036 HEMOGLOBIN GLYCOSYLATED A1C: CPT

## 2025-07-24 PROCEDURE — 82570 ASSAY OF URINE CREATININE: CPT

## 2025-07-24 RX ORDER — SEMAGLUTIDE 0.68 MG/ML
INJECTION, SOLUTION SUBCUTANEOUS
Qty: 3 ML | Refills: 0 | Status: SHIPPED | OUTPATIENT
Start: 2025-07-24

## 2025-08-04 ENCOUNTER — OFFICE VISIT (OUTPATIENT)
Dept: FAMILY MEDICINE CLINIC | Facility: CLINIC | Age: 65
End: 2025-08-04
Payer: MEDICARE

## 2025-08-04 VITALS
OXYGEN SATURATION: 98 % | HEART RATE: 68 BPM | WEIGHT: 256.17 LBS | SYSTOLIC BLOOD PRESSURE: 138 MMHG | TEMPERATURE: 79.9 F | DIASTOLIC BLOOD PRESSURE: 78 MMHG | BODY MASS INDEX: 42.63 KG/M2

## 2025-08-04 DIAGNOSIS — E08.41 DIABETIC MONONEUROPATHY ASSOCIATED WITH DIABETES MELLITUS DUE TO UNDERLYING CONDITION (HCC): Primary | ICD-10-CM

## 2025-08-04 PROCEDURE — G2211 COMPLEX E/M VISIT ADD ON: HCPCS | Performed by: PHYSICIAN ASSISTANT

## 2025-08-04 PROCEDURE — 99214 OFFICE O/P EST MOD 30 MIN: CPT | Performed by: PHYSICIAN ASSISTANT

## 2025-08-08 ENCOUNTER — TELEPHONE (OUTPATIENT)
Dept: MAMMOGRAPHY | Facility: CLINIC | Age: 65
End: 2025-08-08

## 2025-08-13 ENCOUNTER — HOSPITAL ENCOUNTER (OUTPATIENT)
Dept: RADIOLOGY | Facility: CLINIC | Age: 65
Discharge: HOME | End: 2025-08-13
Attending: PHYSICIAN ASSISTANT
Payer: MEDICARE

## 2025-08-13 VITALS — HEIGHT: 65 IN | BODY MASS INDEX: 42.65 KG/M2 | WEIGHT: 256 LBS

## 2025-08-13 DIAGNOSIS — R92.8 ABNORMAL SCREENING MAMMOGRAM: ICD-10-CM

## 2025-08-13 PROCEDURE — 76642 ULTRASOUND BREAST LIMITED: CPT

## 2025-08-13 PROCEDURE — G0279 TOMOSYNTHESIS, MAMMO: HCPCS

## 2025-08-13 PROCEDURE — 77065 DX MAMMO INCL CAD UNI: CPT
